# Patient Record
Sex: MALE | Race: WHITE | NOT HISPANIC OR LATINO | Employment: FULL TIME | URBAN - METROPOLITAN AREA
[De-identification: names, ages, dates, MRNs, and addresses within clinical notes are randomized per-mention and may not be internally consistent; named-entity substitution may affect disease eponyms.]

---

## 2017-05-03 ENCOUNTER — ALLSCRIPTS OFFICE VISIT (OUTPATIENT)
Dept: OTHER | Facility: OTHER | Age: 32
End: 2017-05-03

## 2017-11-16 ENCOUNTER — GENERIC CONVERSION - ENCOUNTER (OUTPATIENT)
Dept: OTHER | Facility: OTHER | Age: 32
End: 2017-11-16

## 2018-01-10 NOTE — PROGRESS NOTES
Assessment    1  Encounter for preventive health examination (V70 0) (Z00 00)   2  Ankle pain (719 47) (M25 579)    Plan  Ankle pain    · * XR ANKLE 3+ VIEW RIGHT; Status:Active; Requested for:16Apr2016;    · * XR ANKLE 3+ VIEW RIGHT; Status:Active; Requested for:16Apr2016;    · *1 - [facility] Referral  Consult  Status: Active  Requested  for: 16Apr2016  Care Summary provided  : Yes  Health Maintenance    · (1) HEMOGLOBIN A1C; Status:Active; Requested for:16Apr2016;    · Urine Dip Non-Automated- POC; Status:Resulted - Requires Verification;   Done:  [id number] 09:26AM    Discussion/Summary    Health maintenance discussion  xray ankle  refer to [facility] to assess and consider treatment/PT options and air cast if needed, continue soft brace  check labs  repeat annually  Chief Complaint  PHYSICAL  ALSO HAVING RT ANKLE PAIN 1 MONTH      History of Present Illness  HPI: 36year old man for routine exam  No sob, palpitations, chest symptoms, rash, GI or  complaint  About a month ago injured right ankle at the gym  Using soft brace  some mild pain  ROS otw negative  Works at [employer] as   No other complaints  Active Problems    1  Ankle pain (719 47) (M25 579)    Family History    · Family history of hypertension (V17 49) (Z82 49)    · Family history of Type 2 diabetes mellitus with complication    Social History    · Never a smoker    Current Meds   1  No Reported Medications Recorded    Allergies    1  No Known Drug Allergies    Vitals   Recorded: 16Apr2016 09:24AM   Temperature 97 5 F   Heart Rate 74   Systolic 849   Diastolic 72   Height 5 ft 6 5 in   Weight 221 lb    BMI Calculated 35 14   BSA Calculated 2 1   O2 Saturation 98     Physical Exam    Constitutional   General appearance: No acute distress, well appearing and well nourished  Head and Face   Head and face: Normal     Eyes   Conjunctiva and lids: No erythema, swelling or discharge      Ophthalmoscopic examination: Normal fundi and optic discs  Ears, Nose, Mouth, and Throat   External inspection of ears and nose: Normal     Oropharynx: Normal with no erythema, edema, exudate or lesions  Pulmonary   Respiratory effort: No increased work of breathing or signs of respiratory distress  Cardiovascular   Auscultation of heart: Normal rate and rhythm, normal S1 and S2, no murmurs  Carotid pulses: 2+ bilaterally  Pedal pulses: 2+ bilaterally  Chest   Chest: Normal     Abdomen   Abdomen: Non-tender, no masses  Genitourinary   Scrotal contents: Normal testes, no masses  Penis: Normal, no lesions  Lymphatic   Palpation of lymph nodes in neck: No lymphadenopathy  Musculoskeletal   Gait and station: Normal     Inspection/palpation of joints, bones, and muscles: Abnormal   slight pain on inversion right ankle, no swelling, no laxity  Neurologic   Cranial nerves: Cranial nerves 2-12 intact  Results/Data  PHQ-2 Adult Depression Screening 16Apr2016 09:27AM Russ Diop     Test Name Result Flag Reference   PHQ-2 Adult Depression Score 0     Q1: 0, Q2: 0   PHQ-2 Adult Depression Screening Negative       Urine Dip Non-Automated- POC [id number] 09:26AM Sarah Resendiz     Test Name Result Flag Reference   Color Clear     Clarity 0 5M     Leukocytes N     Nitrite N     Blood N     Urobilinogen N     Protein N     Ph 5     Specific Gravity 1 000     Ketone N     Glucose N         Procedure    Procedure: Audiometry:   Hearing in the right ear: 20 decibals at 500 hertz, 20 decibals at 1000 hertz, 20 decibals at 2000 hertz, 20 decibals at 4000 hertz, 20 decibals at 6000 hertz and 20 decibals at 8000 hertz  Hearing in the left ear: 20 decibals at 500 hertz, 20 decibals at 1000 hertz, 20 decibals at 2000 hertz, 20 decibals at 4000 hertz, 20 decibals at 6000 hertz and 20 decibals at 8000 hertz        Procedure:   Results: 20/20 in both eyes without corrective device, 20/20 in the right eye without corrective device, 20/20 in the left eye without corrective device RED/GREEN      Signatures   Electronically signed by : Mesha Wisdom DO;  Apr 16 2016  9:40AM EST                       (Author)

## 2018-01-14 VITALS
DIASTOLIC BLOOD PRESSURE: 81 MMHG | SYSTOLIC BLOOD PRESSURE: 120 MMHG | BODY MASS INDEX: 34.22 KG/M2 | WEIGHT: 215.25 LBS | HEART RATE: 77 BPM | RESPIRATION RATE: 18 BRPM

## 2018-01-15 NOTE — RESULT NOTES
Verified Results  * XR ANKLE 3+ VIEW RIGHT 16Apr2016 10:06AM Tamia [place] Order Number: [id number]     Test Name Result Flag Reference   XR ANKLE 3+ VW RIGHT (Report)     RIGHT ANKLE     INDICATION: Right ankle pain for the past month after weight lifting     COMPARISON: None     VIEWS: 3; 3 images     FINDINGS:     There is no acute fracture or dislocation  There is posterior calcaneal bone spur  No significant degenerative arthritis seen  No lytic or blastic lesions are seen  Soft tissues are unremarkable         IMPRESSION:     Posterior calcaneal bone spur       Workstation performed: [id number]     Signed by:   Umair Carr MD   4/17/16

## 2018-01-18 NOTE — RESULT NOTES
Verified Results  (1) CBC/PLT/DIFF [id number] 10:00AM [employer]     Test Name Result Flag Reference   WBC 8 8 x10E3/uL  3 4-10 8   RBC 5 46 x10E6/uL  4 14-5 80   Hemoglobin 16 2 g/dL  12 6-17 7   Hematocrit 47 5 %  37 5-51 0   MCV 87 fL  79-97   MCH 29 7 pg  26 6-33 0   MCHC 34 1 g/dL  31 5-35 7   RDW 13 1 %  12 3-15 4   Platelets 781 J94H8/MV  150-379   Neutrophils 54 %     Lymphs 37 %     Monocytes 6 %     Eos 2 %     Basos 1 %     Neutrophils (Absolute) 4 8 x10E3/uL  1 4-7 0   Lymphs (Absolute) 3 2 x10E3/uL H 0 7-3 1   Monocytes(Absolute) 0 6 x10E3/uL  0 1-0 9   Eos (Absolute) 0 1 x10E3/uL  0 0-0 4   Baso (Absolute) 0 0 x10E3/uL  0 0-0 2   Immature Granulocytes 0 %     Immature Grans (Abs) 0 0 x10E3/uL  0 0-0 1     (1) BASIC METABOLIC PROFILE [id number] 10:00AM [employer]     Test Name Result Flag Reference   Glucose, Serum 94 mg/dL  65-99   BUN 12 mg/dL  6-20   Creatinine, Serum 0 93 mg/dL  0 76-1 27   eGFR If NonAfricn Am 111 mL/min/1 73  >59   eGFR If Africn Am 128 mL/min/1 73  >59   BUN/Creatinine Ratio 13  8-19   Sodium, Serum 142 mmol/L  134-144   Potassium, Serum 4 7 mmol/L  3 5-5 2   Chloride, Serum 102 mmol/L     Carbon Dioxide, Total 24 mmol/L  18-29   Calcium, Serum 9 4 mg/dL  8 7-10 2     (1) HEPATIC FUNCTION PANEL [id number] 10:00AM [employer]     Test Name Result Flag Reference   Protein, Total, Serum 7 1 g/dL  6 0-8 5   Albumin, Serum 4 4 g/dL  3 5-5 5   Bilirubin, Total <0 2 mg/dL  0 0-1 2   Bilirubin, Direct 0 07 mg/dL  0 00-0 40   Alkaline Phosphatase, S 78 IU/L     AST (SGOT) 18 IU/L  0-40   ALT (SGPT) 22 IU/L  0-44     (LC) Lipid Panel [id number] 10:00AM Radha Haskins     Test Name Result Flag Reference   Cholesterol, Total 173 mg/dL  100-199   Triglycerides 325 mg/dL H 0-149   Serum is slightly lipemic  HDL Cholesterol 40 mg/dL  >39   According to ATP-III Guidelines, HDL-C >59 mg/dL is considered a  negative risk factor for CHD     VLDL Cholesterol Giovanni 65 mg/dL H 5-40 LDL Cholesterol Calc 68 mg/dL  0-99     (1) HEMOGLOBIN A1C [id number] 10:00AM Priyanka Call     Test Name Result Flag Reference   Hemoglobin A1c 5 2 %  4 8-5 6   Pre-diabetes: 5 7 - 6 4           Diabetes: >6 4           Glycemic control for adults with diabetes: <7 0

## 2018-01-22 VITALS
BODY MASS INDEX: 35 KG/M2 | RESPIRATION RATE: 20 BRPM | HEART RATE: 89 BPM | DIASTOLIC BLOOD PRESSURE: 88 MMHG | WEIGHT: 220.13 LBS | SYSTOLIC BLOOD PRESSURE: 139 MMHG

## 2018-05-09 VITALS
SYSTOLIC BLOOD PRESSURE: 137 MMHG | BODY MASS INDEX: 36.25 KG/M2 | DIASTOLIC BLOOD PRESSURE: 91 MMHG | HEART RATE: 77 BPM | WEIGHT: 228 LBS | RESPIRATION RATE: 20 BRPM

## 2018-05-09 DIAGNOSIS — M25.571 PAIN, JOINT, ANKLE AND FOOT, RIGHT: Primary | ICD-10-CM

## 2018-05-09 PROCEDURE — 99213 OFFICE O/P EST LOW 20 MIN: CPT | Performed by: ORTHOPAEDIC SURGERY

## 2018-05-09 NOTE — LETTER
May 9, 2018     Patient: Yonathan Rice   YOB: 1985   Date of Visit: 5/9/2018       To Whom it May Concern:    Yonathan Rice is under my professional care  He was seen in my office on 5/9/2018  He may return to work on May 9,2018  Please excuse Mr Salvatore Vergara from being late  He was in our office today  If you have any questions or concerns, please don't hesitate to call           Sincerely,          Lupe Wilde MD        CC: No Recipients

## 2018-05-09 NOTE — PROGRESS NOTES
34 y o male with right ankle weakness and occasional flare-ups which limits him from work  At this time patient is asymptomatic  No numbness tingling fevers chills  Review of Systems  Review of systems negative unless otherwise specified in HPI    Past Medical History  History reviewed  No pertinent past medical history  Past Surgical History  History reviewed  No pertinent surgical history  Current Medications  No current outpatient prescriptions on file prior to visit  No current facility-administered medications on file prior to visit  Recent Labs (HCT,HGB,PT,INR,ESR,CRP,GLU,HgA1C)    0  Lab Value Date/Time   HCT 47 5 04/16/2016 1000   HGB 16 2 04/16/2016 1000   WBC 8 8 04/16/2016 1000   GLUCOSE 94 04/16/2016 1000   HGBA1C 5 2 04/16/2016 1000         Physical exam  [unfilled]  Right ankle:  No abrasions, no open wounds, ankle strength dorsiflexes and plantar flexes about 4+ out of 5 compared to contralateral side  No pain with stress of subtalar joint with eversion or inversion  Neurologically and vascularly intact distally    Imaging      Procedure      Assessment/Plan:   33 y o male right ankle strain which is currently doing well  Plan is as follows:    Weightbearing as tolerated    Follow-up p r n  Discussed treatment plan with patient and he is in agreement treatment plan    Thank you

## 2019-04-11 ENCOUNTER — TELEPHONE (OUTPATIENT)
Dept: OBGYN CLINIC | Facility: HOSPITAL | Age: 34
End: 2019-04-11

## 2019-09-27 ENCOUNTER — OFFICE VISIT (OUTPATIENT)
Dept: FAMILY MEDICINE CLINIC | Facility: CLINIC | Age: 34
End: 2019-09-27
Payer: COMMERCIAL

## 2019-09-27 VITALS
WEIGHT: 243.2 LBS | RESPIRATION RATE: 16 BRPM | OXYGEN SATURATION: 99 % | HEIGHT: 67 IN | BODY MASS INDEX: 38.17 KG/M2 | TEMPERATURE: 97 F | SYSTOLIC BLOOD PRESSURE: 140 MMHG | HEART RATE: 76 BPM | DIASTOLIC BLOOD PRESSURE: 98 MMHG

## 2019-09-27 DIAGNOSIS — Z00.00 ANNUAL PHYSICAL EXAM: ICD-10-CM

## 2019-09-27 DIAGNOSIS — R03.0 ELEVATED BLOOD PRESSURE READING WITHOUT DIAGNOSIS OF HYPERTENSION: ICD-10-CM

## 2019-09-27 DIAGNOSIS — Z00.00 WELL ADULT HEALTH CHECK: Primary | ICD-10-CM

## 2019-09-27 DIAGNOSIS — Z23 ENCOUNTER FOR IMMUNIZATION: ICD-10-CM

## 2019-09-27 DIAGNOSIS — E66.09 CLASS 2 OBESITY DUE TO EXCESS CALORIES WITHOUT SERIOUS COMORBIDITY WITH BODY MASS INDEX (BMI) OF 37.0 TO 37.9 IN ADULT: ICD-10-CM

## 2019-09-27 PROBLEM — E66.812 CLASS 2 OBESITY DUE TO EXCESS CALORIES WITHOUT SERIOUS COMORBIDITY WITH BODY MASS INDEX (BMI) OF 37.0 TO 37.9 IN ADULT: Status: ACTIVE | Noted: 2019-09-27

## 2019-09-27 LAB
SL AMB  POCT GLUCOSE, UA: NORMAL
SL AMB LEUKOCYTE ESTERASE,UA: NORMAL
SL AMB POCT BILIRUBIN,UA: NORMAL
SL AMB POCT BLOOD,UA: NORMAL
SL AMB POCT CLARITY,UA: CLEAR
SL AMB POCT COLOR,UA: YELLOW
SL AMB POCT KETONES,UA: NORMAL
SL AMB POCT NITRITE,UA: NORMAL
SL AMB POCT PH,UA: 5
SL AMB POCT SPECIFIC GRAVITY,UA: 1.01
SL AMB POCT URINE PROTEIN: NORMAL
SL AMB POCT UROBILINOGEN: NORMAL

## 2019-09-27 PROCEDURE — 92551 PURE TONE HEARING TEST AIR: CPT | Performed by: PHYSICIAN ASSISTANT

## 2019-09-27 PROCEDURE — 81002 URINALYSIS NONAUTO W/O SCOPE: CPT | Performed by: PHYSICIAN ASSISTANT

## 2019-09-27 PROCEDURE — 90471 IMMUNIZATION ADMIN: CPT | Performed by: PHYSICIAN ASSISTANT

## 2019-09-27 PROCEDURE — 90686 IIV4 VACC NO PRSV 0.5 ML IM: CPT | Performed by: PHYSICIAN ASSISTANT

## 2019-09-27 PROCEDURE — 99173 VISUAL ACUITY SCREEN: CPT | Performed by: PHYSICIAN ASSISTANT

## 2019-09-27 PROCEDURE — 99395 PREV VISIT EST AGE 18-39: CPT | Performed by: PHYSICIAN ASSISTANT

## 2019-09-27 NOTE — PATIENT INSTRUCTIONS

## 2019-09-27 NOTE — PROGRESS NOTES
[address]    NAME: Eddie Kelly  AGE: 30 y o  SEX: male  : 1985     DATE: 2019     Assessment and Plan:     Problem List Items Addressed This Visit        Other    Class 2 obesity due to excess calories without serious comorbidity with body mass index (BMI) of 37 0 to 37 9 in adult    Relevant Orders    TSH, 3rd generation    Elevated blood pressure reading without diagnosis of hypertension    Relevant Orders    TSH, 3rd generation    Comprehensive metabolic panel    Lipid panel      Other Visit Diagnoses     Well adult health check    -  Primary    Relevant Orders    POCT urine dip (Completed)    Encounter for immunization        Relevant Orders    influenza vaccine, [id number], quadrivalent, 0 5 mL, preservative-free, for adult and pediatric patients 6 mos+ (AFLURIA, FLUARIX, FLULAVAL, FLUZONE) (Completed)          Immunizations and preventive care screenings were discussed with patient today  Appropriate education was printed on patient's after visit summary  Counseling:  · Exercise: the importance of regular exercise/physical activity was discussed  Recommend exercise 3-5 times per week for at least 30 minutes  BMI Counseling: Body mass index is 37 81 kg/m²  The BMI is above normal  Nutrition recommendations include decreasing portion sizes, limiting drinks that contain sugar and moderation in carbohydrate intake  Exercise recommendations include exercising 3-5 times per week  No pharmacotherapy was ordered  Return in about 4 weeks (around 10/25/2019)  Chief Complaint:     Chief Complaint   Patient presents with    Physical Exam      History of Present Illness:     Adult Annual Physical   Patient here for a comprehensive physical exam  The patient reports no problems  Diet and Physical Activity  · Diet/Nutrition: well balanced diet  · Exercise: walking        Depression Screening  PHQ-9 Depression Screening    PHQ-9:    Frequency of the following problems over the past two weeks:       Little interest or pleasure in doing things:  0 - not at all  Feeling down, depressed, or hopeless:  0 - not at all  PHQ-2 Score:  0       General Health  · Sleep: sleeps well  · Hearing: normal - bilateral   · Vision: no vision problems  · Dental: regular dental visits  [facility]  · History of STDs?: no      Review of Systems:     Review of Systems   Constitutional: Negative for activity change, appetite change, chills, fatigue and fever  HENT: Negative for ear pain and sore throat  Eyes: Negative for visual disturbance  Respiratory: Negative for cough and shortness of breath  Cardiovascular: Negative for chest pain, palpitations and leg swelling  Gastrointestinal: Negative for abdominal pain, blood in stool, constipation, diarrhea and nausea  Genitourinary: Negative for difficulty urinating  Musculoskeletal: Positive for back pain  Negative for arthralgias and myalgias  Skin: Negative for rash  Neurological: Negative for dizziness, syncope and headaches  Psychiatric/Behavioral: Negative for sleep disturbance and suicidal ideas  The patient is not nervous/anxious  Past Medical History:     No past medical history on file  Past Surgical History:     No past surgical history on file     Social History:     Social History     Socioeconomic History    Marital status: Single     Spouse name: None    Number of children: None    Years of education: None    Highest education level: None   Occupational History    None   Social Needs    Financial resource strain: None    Food insecurity:     Worry: None     Inability: None    Transportation needs:     Medical: None     Non-medical: None   Tobacco Use    Smoking status: Never Smoker    Smokeless tobacco: Never Used   Substance and Sexual Activity    Alcohol use: None    Drug use: None    Sexual activity: None   Lifestyle    Physical activity:     Days per week: None     Minutes per session: None    Stress: None   Relationships    Social connections:     Talks on phone: None     Gets together: None     Attends Scientologist service: None     Active member of club or organization: None     Attends meetings of clubs or organizations: None     Relationship status: None    Intimate partner violence:     Fear of current or ex partner: None     Emotionally abused: None     Physically abused: None     Forced sexual activity: None   Other Topics Concern    None   Social History Narrative    None      Family History:     Family History   Problem Relation Age of Onset    Hypertension Mother     Diabetes Father     Diabetes type II Father       Current Medications:     No current outpatient medications on file  No current facility-administered medications for this visit  Allergies:     No Known Allergies   Physical Exam:     /98   Pulse 76   Temp (!) 97 °F (36 1 °C)   Resp 16   Ht 5' 7 25" (1 708 m)   Wt 110 kg (243 lb 3 2 oz)   SpO2 99%   BMI 37 81 kg/m²     Physical Exam   Constitutional: He is oriented to person, place, and time  He appears well-developed and well-nourished  Overweight male   HENT:   Head: Normocephalic and atraumatic  Right Ear: Tympanic membrane, external ear and ear canal normal    Left Ear: Tympanic membrane, external ear and ear canal normal    Mouth/Throat: Oropharynx is clear and moist    Eyes: Pupils are equal, round, and reactive to light  Conjunctivae are normal    Neck: Neck supple  Carotid bruit is not present  No thyromegaly present  Cardiovascular: Normal rate, regular rhythm and normal heart sounds  No murmur heard  Pulmonary/Chest: Effort normal and breath sounds normal  He has no wheezes  Abdominal: Soft  Bowel sounds are normal  He exhibits no mass  There is no tenderness  Musculoskeletal: He exhibits no edema  Lymphadenopathy:     He has no cervical adenopathy     Neurological: He is alert and oriented to person, place, and time  Skin: Skin is warm and dry  No rash noted  No erythema  Psychiatric: He has a normal mood and affect  His behavior is normal  Judgment and thought content normal    Nursing note and vitals reviewed        RUDDY Soria

## 2019-09-27 NOTE — PROGRESS NOTES
BMI Counseling: Body mass index is 37 81 kg/m²  The BMI is above normal  Nutrition recommendations include reducing portion sizes, decreasing soda and/or juice intake and moderation in carbohydrate intake  Exercise recommendations include exercising 3-5 times per week

## 2019-10-12 LAB
ALBUMIN SERPL-MCNC: 4.4 G/DL (ref 3.6–5.1)
ALBUMIN/GLOB SERPL: 1.6 (CALC) (ref 1–2.5)
ALP SERPL-CCNC: 66 U/L (ref 40–115)
ALT SERPL-CCNC: 24 U/L (ref 9–46)
AST SERPL-CCNC: 21 U/L (ref 10–40)
BILIRUB SERPL-MCNC: 0.6 MG/DL (ref 0.2–1.2)
BUN SERPL-MCNC: 20 MG/DL (ref 7–25)
BUN/CREAT SERPL: NORMAL (CALC) (ref 6–22)
CALCIUM SERPL-MCNC: 9.2 MG/DL (ref 8.6–10.3)
CHLORIDE SERPL-SCNC: 105 MMOL/L (ref 98–110)
CHOLEST SERPL-MCNC: 217 MG/DL
CHOLEST/HDLC SERPL: 5 (CALC)
CO2 SERPL-SCNC: 26 MMOL/L (ref 20–32)
CREAT SERPL-MCNC: 0.98 MG/DL (ref 0.6–1.35)
GLOBULIN SER CALC-MCNC: 2.8 G/DL (CALC) (ref 1.9–3.7)
GLUCOSE SERPL-MCNC: 85 MG/DL (ref 65–99)
HDLC SERPL-MCNC: 43 MG/DL
LDLC SERPL CALC-MCNC: 133 MG/DL (CALC)
NONHDLC SERPL-MCNC: 174 MG/DL (CALC)
POTASSIUM SERPL-SCNC: 4.6 MMOL/L (ref 3.5–5.3)
PROT SERPL-MCNC: 7.2 G/DL (ref 6.1–8.1)
SL AMB EGFR AFRICAN AMERICAN: 118 ML/MIN/1.73M2
SL AMB EGFR NON AFRICAN AMERICAN: 102 ML/MIN/1.73M2
SODIUM SERPL-SCNC: 140 MMOL/L (ref 135–146)
TRIGL SERPL-MCNC: 267 MG/DL
TSH SERPL-ACNC: 1.74 MIU/L (ref 0.4–4.5)

## 2019-10-30 PROBLEM — E78.2 MIXED HYPERLIPIDEMIA: Status: ACTIVE | Noted: 2019-10-30

## 2019-11-01 ENCOUNTER — OFFICE VISIT (OUTPATIENT)
Dept: FAMILY MEDICINE CLINIC | Facility: CLINIC | Age: 34
End: 2019-11-01
Payer: COMMERCIAL

## 2019-11-01 VITALS
WEIGHT: 242 LBS | SYSTOLIC BLOOD PRESSURE: 138 MMHG | HEART RATE: 83 BPM | DIASTOLIC BLOOD PRESSURE: 90 MMHG | HEIGHT: 67 IN | RESPIRATION RATE: 16 BRPM | BODY MASS INDEX: 37.98 KG/M2 | OXYGEN SATURATION: 98 % | TEMPERATURE: 97.3 F

## 2019-11-01 DIAGNOSIS — E66.09 CLASS 2 OBESITY DUE TO EXCESS CALORIES WITHOUT SERIOUS COMORBIDITY WITH BODY MASS INDEX (BMI) OF 37.0 TO 37.9 IN ADULT: Primary | ICD-10-CM

## 2019-11-01 DIAGNOSIS — E78.2 MIXED HYPERLIPIDEMIA: ICD-10-CM

## 2019-11-01 DIAGNOSIS — R03.0 ELEVATED BLOOD PRESSURE READING WITHOUT DIAGNOSIS OF HYPERTENSION: ICD-10-CM

## 2019-11-01 PROCEDURE — 99214 OFFICE O/P EST MOD 30 MIN: CPT | Performed by: PHYSICIAN ASSISTANT

## 2019-11-01 NOTE — PROGRESS NOTES
Assessment/Plan:     Diagnoses and all orders for this visit:    Class 2 obesity due to excess calories without serious comorbidity with body mass index (BMI) of 37 0 to 37 9 in adult  Comments:  Patient will continue his exercise regimen  Mixed hyperlipidemia  Comments:  Low carb low sugar diet walks the fats we will repeat the lipid panel in 3 months  Orders:  -     Lipid panel; Future  -     Comprehensive metabolic panel; Future    Elevated blood pressure reading without diagnosis of hypertension  Comments:  Patient will watch his salt  Patient will be again home blood pressure monitoring  Call if blood pressure over   140/90  Orders:  -     Comprehensive metabolic panel; Future          Subjective:      Patient ID: Georgie Santana is a 30 y o  male  Patient presents for short interval follow-up for elevated blood pressure  Blood pressure this morning was elevated  Patient states he took his energy drink this morning for he forgot he was coming  Energy drink has about 500 mg of caffeine  Labs reviewed with patient show normal CMP and normal TSH  Id panel reveals a total cholesterol 217 HDL 43  and triglycerides are elevated at 267  Discussed low carb low sugar diet  Patient is exercising regularly  Patient will also need to follow low fat diet  We will reassess lipid panel in 3-4 months patient may need to start medication  The following portions of the patient's history were reviewed and updated as appropriate:   He   Patient Active Problem List    Diagnosis Date Noted    Mixed hyperlipidemia 10/30/2019    Class 2 obesity due to excess calories without serious comorbidity with body mass index (BMI) of 37 0 to 37 9 in adult 09/27/2019    Elevated blood pressure reading without diagnosis of hypertension 09/27/2019     No current outpatient medications on file  No current facility-administered medications for this visit        No current outpatient medications on file prior to visit  No current facility-administered medications on file prior to visit  He has No Known Allergies       Review of Systems   Respiratory: Negative for cough and shortness of breath  Cardiovascular: Negative for chest pain and leg swelling  Neurological: Negative for dizziness and headaches  Objective:        Physical Exam   Constitutional: He is oriented to person, place, and time  He appears well-developed and well-nourished  HENT:   Head: Normocephalic and atraumatic  Right Ear: External ear normal    Left Ear: External ear normal    Cardiovascular: Normal rate and regular rhythm  Pulmonary/Chest: Effort normal and breath sounds normal    Musculoskeletal: He exhibits no edema  Neurological: He is alert and oriented to person, place, and time  Skin: Skin is warm and dry  Nursing note and vitals reviewed

## 2020-02-07 ENCOUNTER — OFFICE VISIT (OUTPATIENT)
Dept: FAMILY MEDICINE CLINIC | Facility: CLINIC | Age: 35
End: 2020-02-07
Payer: COMMERCIAL

## 2020-02-07 VITALS
SYSTOLIC BLOOD PRESSURE: 132 MMHG | RESPIRATION RATE: 16 BRPM | HEIGHT: 66 IN | BODY MASS INDEX: 38.73 KG/M2 | TEMPERATURE: 98.6 F | HEART RATE: 67 BPM | DIASTOLIC BLOOD PRESSURE: 90 MMHG | WEIGHT: 241 LBS | OXYGEN SATURATION: 98 %

## 2020-02-07 DIAGNOSIS — E78.2 MIXED HYPERLIPIDEMIA: Primary | ICD-10-CM

## 2020-02-07 DIAGNOSIS — R03.0 ELEVATED BLOOD PRESSURE READING WITHOUT DIAGNOSIS OF HYPERTENSION: ICD-10-CM

## 2020-02-07 DIAGNOSIS — Z11.4 SCREENING FOR HIV (HUMAN IMMUNODEFICIENCY VIRUS): ICD-10-CM

## 2020-02-07 DIAGNOSIS — E66.09 CLASS 2 OBESITY DUE TO EXCESS CALORIES WITHOUT SERIOUS COMORBIDITY WITH BODY MASS INDEX (BMI) OF 37.0 TO 37.9 IN ADULT: ICD-10-CM

## 2020-02-07 DIAGNOSIS — Z11.59 ENCOUNTER FOR HEPATITIS C SCREENING TEST FOR LOW RISK PATIENT: ICD-10-CM

## 2020-02-07 PROCEDURE — 1036F TOBACCO NON-USER: CPT | Performed by: PHYSICIAN ASSISTANT

## 2020-02-07 PROCEDURE — 3008F BODY MASS INDEX DOCD: CPT | Performed by: PHYSICIAN ASSISTANT

## 2020-02-07 PROCEDURE — 99213 OFFICE O/P EST LOW 20 MIN: CPT | Performed by: PHYSICIAN ASSISTANT

## 2020-02-07 RX ORDER — MULTIVITAMIN
1 TABLET ORAL DAILY
COMMUNITY

## 2020-02-07 NOTE — PROGRESS NOTES
BMI Counseling: Body mass index is 38 61 kg/m²  The BMI is above normal  Nutrition recommendations include decreasing portion sizes and moderation in carbohydrate intake  Exercise recommendations include exercising 3-5 times per week and obtaining a gym membership  No pharmacotherapy was ordered  Assessment/Plan:     Diagnoses and all orders for this visit:    Mixed hyperlipidemia  Comments:  Patient would like to continue with diet  We will check lipid panel in 3 months    Elevated blood pressure reading without diagnosis of hypertension  Comments:  Home blood pressure readings are acceptable  Reading in office is elevated today  Continue diet and exercise for weight loss  Watch the salt    Class 2 obesity due to excess calories without serious comorbidity with body mass index (BMI) of 37 0 to 37 9 in adult  Comments: We have set a goal for patient to lose 40 lb in the year 2020    Encounter for hepatitis C screening test for low risk patient  -     Hepatitis C antibody; Future    Screening for HIV (human immunodeficiency virus)  -     HIV 1/2 AG-AB combo; Future    Other orders  -     Multiple Vitamin (MULTIVITAMIN) tablet; Take 1 tablet by mouth daily          Subjective:      Patient ID: Florinda Huizar is a 37 y o  male  Patient presents for short interval follow-up for elevated blood pressure  Patient also has hyperlipidemia which we are trying to gain control of with diet  His last labs were in October  Patient has been going to the gym and working out  He has been monitoring his blood pressure at home it has been normal   Patient is lost approximately 2 lb according to my scale  According patient scale he is lost 5 lb  Blood pressure is better in the office today  We need to recheck patient's lipid profile        The following portions of the patient's history were reviewed and updated as appropriate:   He   Patient Active Problem List    Diagnosis Date Noted    Mixed hyperlipidemia 10/30/2019    Class 2 obesity due to excess calories without serious comorbidity with body mass index (BMI) of 37 0 to 37 9 in adult 09/27/2019    Elevated blood pressure reading without diagnosis of hypertension 09/27/2019     Current Outpatient Medications   Medication Sig Dispense Refill    Multiple Vitamin (MULTIVITAMIN) tablet Take 1 tablet by mouth daily       No current facility-administered medications for this visit  Current Outpatient Medications on File Prior to Visit   Medication Sig    Multiple Vitamin (MULTIVITAMIN) tablet Take 1 tablet by mouth daily     No current facility-administered medications on file prior to visit       Review of Systems   Constitutional: Negative for activity change, appetite change, chills, fatigue and fever  HENT: Negative for ear pain and sore throat  Eyes: Negative for visual disturbance  Respiratory: Negative for cough and shortness of breath  Cardiovascular: Negative for chest pain, palpitations and leg swelling  Gastrointestinal: Negative for abdominal pain, blood in stool, constipation, diarrhea and nausea  Genitourinary: Negative for difficulty urinating  Musculoskeletal: Negative for arthralgias, back pain and myalgias  Skin: Negative for rash  Neurological: Negative for dizziness, syncope and headaches  Psychiatric/Behavioral: Negative for sleep disturbance  Objective:        Physical Exam   Constitutional: He is oriented to person, place, and time  He appears well-developed and well-nourished  No distress  Over  Weight   male   HENT:   Head: Normocephalic and atraumatic  Right Ear: External ear normal    Left Ear: External ear normal    Mouth/Throat: Oropharynx is clear and moist  No oropharyngeal exudate  Eyes: Pupils are equal, round, and reactive to light  Conjunctivae are normal    Neck: Normal range of motion  No thyromegaly present  Cardiovascular: Normal rate, regular rhythm and normal heart sounds  Pulmonary/Chest: Effort normal    Musculoskeletal: He exhibits no edema  Neurological: He is alert and oriented to person, place, and time  Coordination normal    Skin: Skin is warm and dry  He is not diaphoretic  Nursing note and vitals reviewed

## 2020-03-18 ENCOUNTER — TELEPHONE (OUTPATIENT)
Dept: FAMILY MEDICINE CLINIC | Facility: CLINIC | Age: 35
End: 2020-03-18

## 2020-05-15 ENCOUNTER — TELEMEDICINE (OUTPATIENT)
Dept: FAMILY MEDICINE CLINIC | Facility: CLINIC | Age: 35
End: 2020-05-15
Payer: COMMERCIAL

## 2020-05-15 VITALS
BODY MASS INDEX: 37.17 KG/M2 | HEIGHT: 66 IN | SYSTOLIC BLOOD PRESSURE: 128 MMHG | TEMPERATURE: 99.1 F | DIASTOLIC BLOOD PRESSURE: 90 MMHG | WEIGHT: 231.3 LBS | HEART RATE: 70 BPM

## 2020-05-15 DIAGNOSIS — I10 ESSENTIAL HYPERTENSION: ICD-10-CM

## 2020-05-15 DIAGNOSIS — E78.2 MIXED HYPERLIPIDEMIA: ICD-10-CM

## 2020-05-15 DIAGNOSIS — E66.09 CLASS 2 OBESITY DUE TO EXCESS CALORIES WITHOUT SERIOUS COMORBIDITY WITH BODY MASS INDEX (BMI) OF 37.0 TO 37.9 IN ADULT: Primary | ICD-10-CM

## 2020-05-15 PROCEDURE — 99213 OFFICE O/P EST LOW 20 MIN: CPT | Performed by: PHYSICIAN ASSISTANT

## 2020-05-15 RX ORDER — LISINOPRIL 5 MG/1
5 TABLET ORAL DAILY
Qty: 90 TABLET | Refills: 1 | Status: SHIPPED | OUTPATIENT
Start: 2020-05-15 | End: 2021-01-03

## 2020-09-24 ENCOUNTER — IMMUNIZATIONS (OUTPATIENT)
Dept: FAMILY MEDICINE CLINIC | Facility: CLINIC | Age: 35
End: 2020-09-24
Payer: COMMERCIAL

## 2020-09-24 DIAGNOSIS — Z23 ENCOUNTER FOR IMMUNIZATION: Primary | ICD-10-CM

## 2020-09-24 PROCEDURE — 90686 IIV4 VACC NO PRSV 0.5 ML IM: CPT

## 2020-09-24 PROCEDURE — 90471 IMMUNIZATION ADMIN: CPT

## 2021-01-02 DIAGNOSIS — I10 ESSENTIAL HYPERTENSION: ICD-10-CM

## 2021-01-03 RX ORDER — LISINOPRIL 5 MG/1
TABLET ORAL
Qty: 90 TABLET | Refills: 0 | Status: SHIPPED | OUTPATIENT
Start: 2021-01-03 | End: 2021-03-18 | Stop reason: SDDI

## 2021-03-18 ENCOUNTER — TELEMEDICINE (OUTPATIENT)
Dept: FAMILY MEDICINE CLINIC | Facility: CLINIC | Age: 36
End: 2021-03-18
Payer: COMMERCIAL

## 2021-03-18 VITALS
WEIGHT: 220.4 LBS | TEMPERATURE: 97.5 F | SYSTOLIC BLOOD PRESSURE: 121 MMHG | BODY MASS INDEX: 35.42 KG/M2 | HEIGHT: 66 IN | HEART RATE: 69 BPM | DIASTOLIC BLOOD PRESSURE: 84 MMHG

## 2021-03-18 DIAGNOSIS — E78.2 MIXED HYPERLIPIDEMIA: ICD-10-CM

## 2021-03-18 DIAGNOSIS — E66.09 CLASS 2 OBESITY DUE TO EXCESS CALORIES WITHOUT SERIOUS COMORBIDITY WITH BODY MASS INDEX (BMI) OF 37.0 TO 37.9 IN ADULT: ICD-10-CM

## 2021-03-18 DIAGNOSIS — I10 ESSENTIAL HYPERTENSION: Primary | ICD-10-CM

## 2021-03-18 PROCEDURE — 99213 OFFICE O/P EST LOW 20 MIN: CPT | Performed by: PHYSICIAN ASSISTANT

## 2021-03-18 NOTE — PROGRESS NOTES
Virtual Regular Visit      Assessment/Plan:    Problem List Items Addressed This Visit        Cardiovascular and Mediastinum    Essential hypertension - Primary    Relevant Orders    Comprehensive metabolic panel    Lipid panel       Other    Class 2 obesity due to excess calories without serious comorbidity with body mass index (BMI) of 37 0 to 37 9 in adult    Mixed hyperlipidemia    Relevant Orders    Comprehensive metabolic panel    Lipid panel          BMI Counseling: Body mass index is 37 05 kg/m²  The BMI is above normal  Nutrition recommendations include decreasing portion sizes, consuming healthier snacks and moderation in carbohydrate intake  Exercise recommendations include exercising 3-5 times per week  Reason for visit is   Chief Complaint   Patient presents with    Virtual Regular Visit        Encounter provider Ward Marin PA-C    Provider located at [address] [place] [id number] [phone]      Recent Visits  No visits were found meeting these conditions  Showing recent visits within past 7 days and meeting all other requirements     Today's Visits  Date Type Provider Dept   03/18/21 Telemedicine RUDDY Parikh Pg   Showing today's visits and meeting all other requirements     Future Appointments  No visits were found meeting these conditions  Showing future appointments within next 150 days and meeting all other requirements        The patient was identified by name and date of birth  Antoine Power was informed that this is a telemedicine visit and that the visit is being conducted through [employer] and patient was informed that this is not a secure, HIPAA-compliant platform  He agrees to proceed     My office door was closed  No one else was in the room  He acknowledged consent and understanding of privacy and security of the video platform   The patient has agreed to participate and understands they can discontinue the visit at any time  Patient is aware this is a billable service  Subjective  Kristin Howell is a 31 y o  male   Being seen by video visit for follow up chronic conditions  Patient has hypertension and hyperlipidemia he is also overweight  Patient has been working very hard with diet and exercise to lose weight  Patient states his current weight is 220 lb he is lost 11 lb  Patient states his blood pressure is normal and he is not on the lisinopril 5 mg  He is due for some lab work  Patient states he has a goal to lose the 20 lb to be 199 over the next 2-3 months  No past medical history on file  No past surgical history on file  Current Outpatient Medications   Medication Sig Dispense Refill    Multiple Vitamin (MULTIVITAMIN) tablet Take 1 tablet by mouth daily       No current facility-administered medications for this visit  No Known Allergies    Review of Systems   Constitutional: Negative for activity change, appetite change, chills, fatigue and fever  HENT: Negative for ear pain and sore throat  Eyes: Negative for visual disturbance  Respiratory: Negative for cough and shortness of breath  Cardiovascular: Negative for chest pain, palpitations and leg swelling  Gastrointestinal: Negative for abdominal pain, blood in stool, constipation, diarrhea and nausea  Genitourinary: Negative for difficulty urinating  Musculoskeletal: Negative for arthralgias, back pain and myalgias  Skin: Negative for rash  Neurological: Negative for dizziness, syncope and headaches  Psychiatric/Behavioral: Negative for sleep disturbance  Video Exam    Vitals:    03/18/21 0921 03/18/21 1131   BP: 121/84    Pulse: 69    Temp: 97 5 °F (36 4 °C)    Weight:  100 kg (220 lb 6 4 oz)   Height: 5' 6 25" (1 683 m)        Physical Exam  Constitutional:       Appearance: He is obese     HENT:      Right Ear: External ear normal       Left Ear: External ear normal    Eyes:      Conjunctiva/sclera: Conjunctivae normal    Pulmonary:      Effort: Pulmonary effort is normal    Skin:     Coloration: Skin is not pale  Findings: No erythema  I spent 14 minutes directly with the patient during this visit      VIRTUAL VISIT DISCLAIMER    Wilner Persaud acknowledges that he has consented to an online visit or consultation  He understands that the online visit is based solely on information provided by him, and that, in the absence of a face-to-face physical evaluation by the physician, the diagnosis he receives is both limited and provisional in terms of accuracy and completeness  This is not intended to replace a full medical face-to-face evaluation by the physician  Wilner Persaud understands and accepts these terms

## 2021-10-05 ENCOUNTER — TELEPHONE (OUTPATIENT)
Dept: OBGYN CLINIC | Facility: HOSPITAL | Age: 36
End: 2021-10-05

## 2021-10-14 ENCOUNTER — OFFICE VISIT (OUTPATIENT)
Dept: OBGYN CLINIC | Facility: HOSPITAL | Age: 36
End: 2021-10-14
Payer: COMMERCIAL

## 2021-10-14 VITALS
DIASTOLIC BLOOD PRESSURE: 100 MMHG | SYSTOLIC BLOOD PRESSURE: 159 MMHG | WEIGHT: 243 LBS | HEART RATE: 77 BPM | BODY MASS INDEX: 39.05 KG/M2 | HEIGHT: 66 IN

## 2021-10-14 DIAGNOSIS — G89.29 CHRONIC PAIN OF RIGHT ANKLE: Primary | ICD-10-CM

## 2021-10-14 DIAGNOSIS — M25.571 CHRONIC PAIN OF RIGHT ANKLE: Primary | ICD-10-CM

## 2021-10-14 PROCEDURE — 99213 OFFICE O/P EST LOW 20 MIN: CPT | Performed by: ORTHOPAEDIC SURGERY

## 2021-10-14 PROCEDURE — 3008F BODY MASS INDEX DOCD: CPT | Performed by: ORTHOPAEDIC SURGERY

## 2021-10-14 RX ORDER — FEXOFENADINE HCL 180 MG/1
180 TABLET ORAL DAILY
COMMUNITY

## 2021-11-08 ENCOUNTER — IMMUNIZATIONS (OUTPATIENT)
Dept: FAMILY MEDICINE CLINIC | Facility: CLINIC | Age: 36
End: 2021-11-08
Payer: COMMERCIAL

## 2021-11-08 DIAGNOSIS — Z23 ENCOUNTER FOR IMMUNIZATION: Primary | ICD-10-CM

## 2021-11-08 PROCEDURE — 90686 IIV4 VACC NO PRSV 0.5 ML IM: CPT

## 2021-11-08 PROCEDURE — 90471 IMMUNIZATION ADMIN: CPT

## 2021-12-03 ENCOUNTER — IMMUNIZATIONS (OUTPATIENT)
Dept: FAMILY MEDICINE CLINIC | Facility: HOSPITAL | Age: 36
End: 2021-12-03

## 2021-12-03 DIAGNOSIS — Z23 ENCOUNTER FOR IMMUNIZATION: Primary | ICD-10-CM

## 2021-12-03 PROCEDURE — 0064A COVID-19 MODERNA VACC 0.25 ML BOOSTER: CPT

## 2021-12-03 PROCEDURE — 91306 COVID-19 MODERNA VACC 0.25 ML BOOSTER: CPT

## 2022-04-15 ENCOUNTER — OFFICE VISIT (OUTPATIENT)
Dept: OBGYN CLINIC | Facility: CLINIC | Age: 37
End: 2022-04-15
Payer: COMMERCIAL

## 2022-04-15 VITALS — BODY MASS INDEX: 40.02 KG/M2 | HEIGHT: 66 IN | WEIGHT: 249 LBS

## 2022-04-15 DIAGNOSIS — M25.571 CHRONIC PAIN OF RIGHT ANKLE: Primary | ICD-10-CM

## 2022-04-15 DIAGNOSIS — G89.29 CHRONIC PAIN OF RIGHT ANKLE: Primary | ICD-10-CM

## 2022-04-15 PROCEDURE — 3008F BODY MASS INDEX DOCD: CPT | Performed by: ORTHOPAEDIC SURGERY

## 2022-04-15 PROCEDURE — 99213 OFFICE O/P EST LOW 20 MIN: CPT | Performed by: ORTHOPAEDIC SURGERY

## 2022-04-15 NOTE — PROGRESS NOTES
Orthopedics          Osiris Flores 30 y o  male MRN: [id number]      Chief Complaint:   right ankle pain    HPI:   30 y  o male complaining of right ankle pain  Patient has been struggling with chronic right ankle pain  Patient states he is unable to work some days due to bouts of pain in his right ankle  He states he has last for approximately 1 day  States it is relieved with ice elevation wrapping and medication  Patient notes being out of work over last 6 months 5-10 times due to his ankle pain  Patient has been performing home range of motion stretching strengthening exercises learned in physical therapy  Denies any changes numbness tingling right lower extremity                Review Of Systems:   · Skin: Normal  · Neuro: See HPI  · Musculoskeletal: See HPI  · All other systems reviewed and are negative    Past Medical History:   History reviewed  No pertinent past medical history  Past Surgical History:   History reviewed  No pertinent surgical history      Family History:  Family history reviewed and non-contributory  Family History   Problem Relation Age of Onset    Hypertension Mother     Diabetes Father     Diabetes type II Father          Social History:  Social History     Socioeconomic History    Marital status: Single     Spouse name: None    Number of children: None    Years of education: None    Highest education level: None   Occupational History    None   Tobacco Use    Smoking status: Never Smoker    Smokeless tobacco: Never Used   Substance and Sexual Activity    Alcohol use: Yes     Comment: rare    Drug use: Never    Sexual activity: None   Other Topics Concern    None   Social History Narrative    None     Social Determinants of Health     Financial Resource Strain: Not on file   Food Insecurity: Not on file   Transportation Needs: Not on file   Physical Activity: Not on file   Stress: Not on file   Social Connections: Not on file   Intimate Partner Violence: Not on file   Housing Stability: Not on file       Allergies:   No Known Allergies    Labs:  0   Lab Value Date/Time    HCT 47 5 04/16/2016 1000    HGB 16 2 04/16/2016 1000    WBC 8 8 04/16/2016 1000       Meds:    Current Outpatient Medications:     fexofenadine (ALLEGRA) 180 MG tablet, Take 180 mg by mouth daily, Disp: , Rfl:     Multiple Vitamin (MULTIVITAMIN) tablet, Take 1 tablet by mouth daily, Disp: , Rfl:       Physical Exam:   Vitals:       General Appearance:    Alert, cooperative, no distress, appears stated age   Head:    Normocephalic, without obvious abnormality, atraumatic   Eyes:    conjunctiva/corneas clear, both eyes        Nose:   Nares normal, septum midline, no drainage    Throat:   Lips normal; teeth and gums normal   Neck:    symmetrical, trachea midline, ;     thyroid:  no enlargement/   Back:     Symmetric, no curvature, ROM normal   Lungs:   No audible wheezing or labored breathing   Chest Wall:    No tenderness or deformity    Heart:    Regular rate and rhythm               Pulses:   2+ and symmetric all extremities   Skin:   Skin color, texture, turgor normal, no rashes or lesions   Neurologic:   normal strength, sensation and reflexes     throughout       Musculoskeletal: right lower extremity  Examination patient's right ankle no effusion no swelling no erythema active ankle dorsi plantar flexion inversion eversion motion equal to left ankle dorsiflexion 5° plantar flexion greater than 30° mild weakness noted with inversion as compared left lower extremity external rotation strength 5/5 dorsi plantar flexion strength 5/5 sensation tacked distal pulses present no pain on palpation medial lateral malleoli no pain palpation posteriorly peroneal tendons Achilles tendon posterior tibial tendon regions negative anterior drawer    _*_*_*_*_*_*_*_*_*_*_*_*_*_*_*_*_*_*_*_*_*_*_*_*_*_*_*_*_*_*_*_*_*_*_*_*_*_*_*_*_*    Assessment:  37 y  o male with right ankle pain chronic in nature    Plan:   · Weight bearing as tolerated  right lower extremity  · Case discussed with Dr Eri Davis  · Patient may have leave from work as needed due to right chronic ankle pain   · No surgical intervention recommended at this time regarding patient's right ankle   · Continue home range of motion stretching strengthening exercise  · Patient will follow-up with [facility] regarding further evaluation treatment of patient's chronic right ankle pain        Kendall Abbasi PA-C

## 2022-06-08 ENCOUNTER — TELEMEDICINE (OUTPATIENT)
Dept: FAMILY MEDICINE CLINIC | Facility: CLINIC | Age: 37
End: 2022-06-08
Payer: COMMERCIAL

## 2022-06-08 DIAGNOSIS — Z20.822 EXPOSURE TO COVID-19 VIRUS: Primary | ICD-10-CM

## 2022-06-08 PROCEDURE — 99214 OFFICE O/P EST MOD 30 MIN: CPT | Performed by: FAMILY MEDICINE

## 2022-06-08 PROCEDURE — U0003 INFECTIOUS AGENT DETECTION BY NUCLEIC ACID (DNA OR RNA); SEVERE ACUTE RESPIRATORY SYNDROME CORONAVIRUS 2 (SARS-COV-2) (CORONAVIRUS DISEASE [COVID-19]), AMPLIFIED PROBE TECHNIQUE, MAKING USE OF HIGH THROUGHPUT TECHNOLOGIES AS DESCRIBED BY CMS-2020-01-R: HCPCS | Performed by: FAMILY MEDICINE

## 2022-06-08 PROCEDURE — U0005 INFEC AGEN DETEC AMPLI PROBE: HCPCS | Performed by: FAMILY MEDICINE

## 2022-06-08 NOTE — ASSESSMENT & PLAN NOTE
Symptom onset: 6/5/22  Date of exposure: 6/4/22  Date of positive test: test ordered    Symptoms includes:  nasal congestion, rhinorrhea, sore throat (mild) and cough    Please maintain appropriate social distancing, wear a mask at all public spaces, wash hands frequently and clean surface after use  If you have fever greater than 104° that does not respond to Tylenol, difficulty eating or drinking, difficulty breathing please report to ER for evaluation    Per patient request, will test him, his son and daughter  Anibal Balderas and Michaela You

## 2022-06-08 NOTE — PROGRESS NOTES
Outpatient Progress Note    Assessment/Plan:    Problem List Items Addressed This Visit        Other    Exposure to COVID-19 virus - Primary     Symptom onset: 6/5/22  Date of exposure: 6/4/22  Date of positive test: test ordered    Symptoms includes:  nasal congestion, rhinorrhea, sore throat (mild) and cough    Please maintain appropriate social distancing, wear a mask at all public spaces, wash hands frequently and clean surface after use  If you have fever greater than 104° that does not respond to Tylenol, difficulty eating or drinking, difficulty breathing please report to ER for evaluation    Per patient request, will test him, his son and daughter  Tyron Rodriguez and Tiffanie Sic           Relevant Orders    COVID Only - Office Collect         Disposition:     I have spent 15 minutes directly with the patient  Encounter provider Priya Maya MD    Provider located at [address] [place] [id number] [phone]    Recent Visits  No visits were found meeting these conditions  Showing recent visits within past 7 days and meeting all other requirements  Today's Visits  Date Type Provider Dept   06/08/22 Telemedicine MD Juan Antonio Jackson   Showing today's visits and meeting all other requirements  Future Appointments  No visits were found meeting these conditions  Showing future appointments within next 150 days and meeting all other requirements     This virtual check-in was done via [facility] and patient was informed that this is a secure, HIPAA-compliant platform  He agrees to proceed  Patient agrees to participate in a virtual check in via telephone or video visit instead of presenting to the office to address urgent/immediate medical needs  Patient is aware this is a billable service  After connecting through [facility], the patient was identified by name and date of birth   Antoine Power was informed that this was a telemedicine visit and that the exam was being conducted confidentially over secure lines  My office door was closed  No one else was in the room  Jimbo Roblero acknowledged consent and understanding of privacy and security of the telemedicine visit  I informed the patient that I have reviewed his record in Epic and presented the opportunity for him to ask any questions regarding the visit today  The patient agreed to participate  Verification of patient location:  Patient is located in the following state in which I hold an active license: [place]    Subjective:   Jimbo Roblero is a 30 y o  male who is concerned about COVID-19  Patient's symptoms include nasal congestion, rhinorrhea, sore throat (mild) and cough  Patient denies fever, chills, fatigue, malaise, anosmia, loss of taste, shortness of breath, chest tightness, abdominal pain, nausea, vomiting, diarrhea, myalgias and headaches       - Date of symptom onset: 6/5/2022      COVID-19 vaccination status: Fully vaccinated (primary series)    Exposure:   Contact with a person who is under investigation (PUI) for or who is positive for COVID-19 within the last 14 days?: Yes    Hospitalized recently for fever and/or lower respiratory symptoms?: No      Currently a healthcare worker that is involved in direct patient care?: No      Works in a special setting where the risk of COVID-19 transmission may be high? (this may include long-term care, correctional and half-way facilities; homeless shelters; assisted-living facilities and group homes ): No      Resident in a special setting where the risk of COVID-19 transmission may be high? (this may include long-term care, correctional and half-way facilities; homeless shelters; assisted-living facilities and group homes ): No      COVID case at job  Kids have similar symptoms      Lab Results   Component Value Date    SARSCOV2 NOT DETECTED 04/05/2022    [facility] [address] [facility] Detected (A) 01/09/2022     No past medical history on file  No past surgical history on file  Current Outpatient Medications   Medication Sig Dispense Refill    fexofenadine (ALLEGRA) 180 MG tablet Take 180 mg by mouth daily      Multiple Vitamin (MULTIVITAMIN) tablet Take 1 tablet by mouth daily       No current facility-administered medications for this visit  No Known Allergies    Review of Systems   Constitutional: Negative for chills, fatigue and fever  HENT: Positive for congestion, rhinorrhea and sore throat (mild)  Respiratory: Positive for cough  Negative for chest tightness and shortness of breath  Gastrointestinal: Negative for abdominal pain, diarrhea, nausea and vomiting  Musculoskeletal: Negative for myalgias  Neurological: Negative for headaches  Objective: There were no vitals filed for this visit  Physical Exam  Pulmonary:      Effort: Pulmonary effort is normal    Neurological:      Mental Status: He is alert  Psychiatric:         Mood and Affect: Mood normal          VIRTUAL VISIT DISCLAIMER    Michael Colindres verbally agrees to participate in [employer]  Pt is aware that [employer] could be limited without vital signs or the ability to perform a full hands-on physical Nael Hopkins understands he or the provider may request at any time to terminate the video visit and request the patient to seek care or treatment in person

## 2022-06-08 NOTE — LETTER
June 8, 2022     Patient: Kia Hurtado  YOB: 1985  Date of Visit: 6/8/2022      To Whom it May Concern:    Kia Hurtado is under my professional care  Venkata Hwang was seen in my office on 6/8/2022  Edwar {Return to school/sport/work:[id number]}  If you have any questions or concerns, please don't hesitate to call           Sincerely,          Ezequiel Garcia MD        CC: No Recipients

## 2022-06-09 LAB — SARS-COV-2 RNA RESP QL NAA+PROBE: NEGATIVE

## 2022-09-28 ENCOUNTER — APPOINTMENT (OUTPATIENT)
Dept: RADIOLOGY | Facility: MEDICAL CENTER | Age: 37
End: 2022-09-28
Payer: COMMERCIAL

## 2022-09-28 ENCOUNTER — OFFICE VISIT (OUTPATIENT)
Dept: OBGYN CLINIC | Facility: MEDICAL CENTER | Age: 37
End: 2022-09-28
Payer: COMMERCIAL

## 2022-09-28 VITALS
BODY MASS INDEX: 40.18 KG/M2 | HEIGHT: 66 IN | WEIGHT: 250 LBS | SYSTOLIC BLOOD PRESSURE: 161 MMHG | DIASTOLIC BLOOD PRESSURE: 110 MMHG | HEART RATE: 89 BPM

## 2022-09-28 DIAGNOSIS — M25.571 CHRONIC PAIN OF RIGHT ANKLE: ICD-10-CM

## 2022-09-28 DIAGNOSIS — G89.29 CHRONIC PAIN OF RIGHT ANKLE: ICD-10-CM

## 2022-09-28 DIAGNOSIS — M25.561 ACUTE PAIN OF RIGHT KNEE: Primary | ICD-10-CM

## 2022-09-28 DIAGNOSIS — M25.561 CHRONIC PAIN OF RIGHT KNEE: ICD-10-CM

## 2022-09-28 DIAGNOSIS — G89.29 CHRONIC PAIN OF RIGHT KNEE: ICD-10-CM

## 2022-09-28 DIAGNOSIS — M22.2X1 PATELLOFEMORAL PAIN SYNDROME OF RIGHT KNEE: ICD-10-CM

## 2022-09-28 PROCEDURE — 73610 X-RAY EXAM OF ANKLE: CPT

## 2022-09-28 PROCEDURE — 99215 OFFICE O/P EST HI 40 MIN: CPT | Performed by: STUDENT IN AN ORGANIZED HEALTH CARE EDUCATION/TRAINING PROGRAM

## 2022-09-28 PROCEDURE — 73564 X-RAY EXAM KNEE 4 OR MORE: CPT

## 2022-09-28 NOTE — LETTER
September 28, 2022     Patient: Felix June  YOB: 1985  Date of Visit: 9/28/2022      To Whom it May Concern:    Felix June is under my professional care  Leonie Juanjo was seen in my office on 9/28/2022  In regards to his right ankle flare ups, he is restricted up to 8 hours for 3x/week as needed; as described in [facility] paperwork  If you have any questions or concerns, please don't hesitate to call           Sincerely,          Jarad Haywood MD        CC: Felix Soic

## 2022-09-28 NOTE — PROGRESS NOTES
1  Acute pain of right knee  XR knee 4+ vw right injury    Ambulatory Referral to Physical Therapy   2  Chronic pain of right ankle  XR ankle 3+ vw right    Ambulatory Referral to Physical Therapy   3  Patellofemoral pain syndrome of right knee  Ambulatory Referral to Physical Therapy     Orders Placed This Encounter   Procedures    XR knee 4+ vw right injury    XR ankle 3+ vw right    Ambulatory Referral to Physical Therapy        Imaging Studies (I personally reviewed images in PACS and report):     X-ray right knee 09/28/2022:  No acute osseous abnormalities  No significant degenerative changes  No joint effusion   X-ray right ankle 09/20/2022:  No acute osseous abnormalities  Mortise intact  Calcaneal in enthesophye at the insertion of Achilles tendon  IMPRESSION:  · Acute right knee pain - ongoing for the past couple days without a precipitating injury  Radiographs unremarkable  Clinically consistent with patellofemoral pain syndrome  · Chronic right ankle pain - patient reports has been an ongoing issue for 10+ years since a football injury and having weak ankles    He states that there is an intra-articular loose body in his ankle that was only seen on a prior MRI  He states there was discussion for a surgical intervention at some point but that he decided to defer this option and has been having yearly [facility] paperwork with restrictions as per communications  I cannot confirm any prior MRIs other than an x-ray of his ankle from 2016  He may have seen an outside of our health network but I cannot confirm this  Other factors:   BMI 40    PLAN:     Clinical exam and radiographic imaging reviewed with patient today, with impression as per above  I have discussed with the patient the pathophysiology of this diagnosis and reviewed how the examination correlates with this diagnosis   Imaging obtained of his right knee and right ankle today as noted above    Will follow-up official radiology interpretation   In regards to his right knee pain, I recommended conservative treatment at this time  I referred him to formal physical therapy for his right knee as well as his right ankle  I recommended a minimum of 4-6 weeks before transitioning to a home exercise program   In regards to pain control recommended p r n  Use of NSAIDs, acetaminophen, heat/ice therapy 20 minutes on/off   In regards to his right ankle pain, I obtained new imaging of his right ankle as noted above  I cannot confirm some of the ongoing issues that have caused patient to have chronic ankle pain for several years  I recommended that in addition to the physical therapy for his right knee that he also undergo formal physical therapy for his right ankle as well to facilitate recovery  After at least attending at least 4 weeks of physical therapy, I will consider obtaining an MRI of his right ankle without contrast for further evaluation   I have provided a work note today as per communications  On chart review, there is prior [place] paperwork with restrictions  I will continue these restrictions at this time while working on conservative treatments and will have our team complete his FMLA paperwork  Patient will need to bring in the [employer] [facility] [employer] [facility] paperwork as well  I counseled the paperwork intake 10-14 business stays in order to complete  Return in about 8 weeks (around 11/23/2022)  Portions of the record may have been created with voice recognition software  Occasional wrong word or "sound a like" substitutions may have occurred due to the inherent limitations of voice recognition software  Read the chart carefully and recognize, using context, where substitutions have occurred       I have spent 60 minutes with Patient  today in which greater than 50% of this time was spent in counseling/coordination of care regarding Diagnostic results, Prognosis, Risks and benefits of tx options, Intructions for management, Patient and family education, Importance of tx compliance, Risk factor reductions, Impressions and obtaining/interpreting new imaging, reviewing prior charts  CHIEF COMPLAINT:  Chief Complaint   Patient presents with    Right Ankle - Pain         HPI:  Natividad Hatch is a 30 y o  male  who presents for       Visit 9/28/2022 :  Initial evaluation of right ankle pain:  Patient reports this has been an ongoing issue for 10+ years from a football injury  He states he also has a history of "weak ankles" in general  He states that he had a workup from a prior provider in which an MRI was obtained  He states that he was offered a surgical intervention for a intra-articular loose body but that patient ended up deferring this option  He states ever since this workup, he has been having flare-ups of his right ankle pain and has [place] paperwork allowing him restricted hours for these flare-ups of pain of his ankle  He states when he has pain of his ankle it is over the medial lateral aspects he describes as a sharp/aching pain of moderate to severe intensity in which he has difficulty weight-bearing on his right lower extremity  He states his ankles can swell up  He denies any discoloration  Reports intermittent tingling sensation of his ankle  He states with interventions such as NSAIDs, elevation, icing his pain does eventually resolved  Thus, upon chart review, he has had FMLA paperwork restricting him for 8 hours a day for 3 times a week to be excused from work as needed for ankle pain flares  He is asking for an updated FMLA form as it is expiring  He states that he did see physical therapy for this issue, but this was several years ago  Of note, I cannot confirm the extent of his injury, treatment plan, imaging studies (other than an ankle X-ray from 2016) of his right ankle based on chart review today  Initial evaluation of right knee pain:  This is a separate issue from his right ankle pain    He states this has been ongoing for the past couple days  No precipitating injury  He states he works in the [employer] in which he sits for a prolonged period of time  He states while sitting he noticed a progressive knee aching pain over the inferolateral aspect of his patella  He states that his knee was getting locking up    Last reports initial swelling but this has resolved  He states the symptoms have been improving but not completely resolved  He states the pain can be aggravated from prolonged sitting or range of motion movements  He reports intermittent crepitus of his knees  Denies prior injuries or surgeries of his right knee in the past     Medical, Surgical, Family, and Social History    History reviewed  No pertinent past medical history  History reviewed  No pertinent surgical history  Social History   Social History     Substance and Sexual Activity   Alcohol Use Yes    Comment: rare     Social History     Substance and Sexual Activity   Drug Use Never     Social History     Tobacco Use   Smoking Status Never Smoker   Smokeless Tobacco Never Used     Family History   Problem Relation Age of Onset    Hypertension Mother     Diabetes Father     Diabetes type II Father      No Known Allergies       Physical Exam  BP (!) 161/110   Pulse 89   Ht 5' 6" (1 676 m)   Wt 113 kg (250 lb)   BMI 40 35 kg/m²     Constitutional:  see vital signs  Gen: obese, normocephalic/atraumatic, well-groomed  Eyes: No inflammation or discharge of conjunctiva or lids; sclera clear   Pharynx: no inflammation, lesion, or mass of lips  Neck: supple, no masses, non-distended  MSK: no inflammation, lesion, mass, or clubbing of nails and digits except for other than mentioned below  SKIN: no visible rashes or skin lesions  Pulmonary/Chest: Effort normal  No respiratory distress     NEURO: cranial nerves grossly intact  PSYCH:  Alert and oriented to person, place, and time; recent and remote memory intact; mood normal, no depression, anxiety, or agitation, judgment and insight good and intact     Ortho Exam  Right Knee Exam:  Erythema: no  Swelling: no  Increased Warmth: no  Tenderness: +inferomedial/inferolateral aspects of the patella  ROM: 0-130 (full extension and flexion beyond 90 aggravates brenda-patellar knee pain)  Knee flexion strength: 5/5  Knee extension strength: 5/5  Patellar compression: + pain  Patellar Grind: +  Lachman's: negative  Varus laxity: negative  Valgus laxity: negative     Ankle Examination (focused):     Gait: slight antalgia      RIGHT   Inspection Erythema none    Edema none    Ecchymosis none        ROM:  Plantarflexion 30    Dorsiflexion 10        Strength Pronation 5-/5    Supination 5-/5    Foot plantarflexion 5/5    Foot dorsflexion 5/5        TTP AiTFL no    ATFL no    CFL no    PTFL no    Achilles no    Deltoid no    Peroneal no    Tib Ant no    Tib Post no        TTP (Bony) Prox Fibula no    Lat Malleolus no    Base of 5th MT no    Med Malleolus +    Navicular no    Talar Dome no        Anterior Drawer ATFL Negative laxity, but reported +ankle pain   Calcaneal Squeeze  negative   Tib-Fib Squeeze Test  negative   Talar Tilt (stab tib,DF foot,invert foot) CFL Negative laxity, but reported +ankle pain   ER Stress (stab tib,ER foot) High ankle negative   Eversion stress (stab tib, jermaine foot) deltoid negative   MT Compression  negative         No calf tenderness to palpation     LE NV Exam: +2 DP/PT pulses   Sensation intact to light touch   Flexion/extension of toes intact              Procedures

## 2022-09-29 ENCOUNTER — TELEPHONE (OUTPATIENT)
Dept: OBGYN CLINIC | Facility: HOSPITAL | Age: 37
End: 2022-09-29

## 2022-09-29 NOTE — TELEPHONE ENCOUNTER
Patient called to confirm we receive the disability form    And making sure his restriction match is work note from the date he was seen

## 2022-10-06 ENCOUNTER — TELEPHONE (OUTPATIENT)
Dept: OBGYN CLINIC | Facility: CLINIC | Age: 37
End: 2022-10-06

## 2022-10-06 NOTE — TELEPHONE ENCOUNTER
Caller: Beltran Mathews    Doctor/Office: n/a    Caller asked to speak to: Physical Therapy      Call was transferred to: Physical Therapy

## 2022-10-28 ENCOUNTER — EVALUATION (OUTPATIENT)
Dept: PHYSICAL THERAPY | Facility: MEDICAL CENTER | Age: 37
End: 2022-10-28
Payer: COMMERCIAL

## 2022-10-28 DIAGNOSIS — M25.561 ACUTE PAIN OF RIGHT KNEE: Primary | ICD-10-CM

## 2022-10-28 DIAGNOSIS — M22.2X1 PATELLOFEMORAL PAIN SYNDROME OF RIGHT KNEE: ICD-10-CM

## 2022-10-28 DIAGNOSIS — G89.29 CHRONIC PAIN OF RIGHT ANKLE: ICD-10-CM

## 2022-10-28 DIAGNOSIS — M25.571 CHRONIC PAIN OF RIGHT ANKLE: ICD-10-CM

## 2022-10-28 PROCEDURE — 97161 PT EVAL LOW COMPLEX 20 MIN: CPT | Performed by: PHYSICAL THERAPIST

## 2022-10-28 NOTE — PROGRESS NOTES
PT Evaluation     Today's date: 10/28/2022  Patient name: Eduard Stockton  : 1985  MRN: [id number]  Referring provider: Leonel Aguilera MD  Dx:   Encounter Diagnosis     ICD-10-CM    1  Acute pain of right knee  M25 561 Ambulatory Referral to Physical Therapy   2  Chronic pain of right ankle  M25 571     G89 29    3  Patellofemoral pain syndrome of right knee  M22 2X1        Start Time: 1330  Stop Time: 1420  Total time in clinic (min): 50 minutes    Assessment  Assessment details: Pt is a 30 y o male who presents with increased R ankle and R knee pain, decreased R knee and ankle ROM, decreased R LE strength and decreased activity tolerance  These impairments limit the patient from participating in exercising at [employer], decreased tolerance to work related exercises and decreased ability to complete house and yard work  I believe this patient is a good candidate for and will benefit from skilled physical therapy for manual therapy to the ankle and knee, LE strengthening exercises, ROM exercises for the R ankle and knee and mechanics training to improve limitations and assist the patient to return to [place]  Thank you for the opportunity to participate in [facility]      Positive Prognostic Indicators: desire to improve    Negative Prognostic Indicators: insurance  Impairments: abnormal gait, abnormal muscle tone, abnormal or restricted ROM, abnormal movement, activity intolerance, impaired physical strength, lacks appropriate home exercise program, pain with function and poor posture     Symptom irritability: lowUnderstanding of Dx/Px/POC: good   Prognosis: good    Goals  STGs: 4 weeks  1) Pt will have SPR decrease of 2 units at worst for both body parts  2) pt will have improved R knee extension strength to 5/5  3) pt will have improved foto score of 10 points  4) pt will have improved improved R ankle DF AROM by 5*    LTGs: 8 weeks  1) pt will be independent with HEP by D/C  2) pt will be independent with symptom management by D/C  3) pt will subjectively report improved overall symptoms by at least 50% with functional activity in order to progress towards regular exercise by DC  Plan  Patient would benefit from: skilled physical therapy  Planned modality interventions: cryotherapy and thermotherapy: hydrocollator packs  Planned therapy interventions: joint mobilization, manual therapy, neuromuscular re-education, patient education, postural training, strengthening, stretching, therapeutic activities, therapeutic exercise, home exercise program, gait training and functional ROM exercises  Frequency: 1x week  Duration in weeks: 12  Plan of Care beginning date: 10/28/2022  Plan of Care expiration date: 1/20/2023  Treatment plan discussed with: patient        Subjective Evaluation    History of Present Illness  Mechanism of injury: Subjective Comments: pt reports chronic ankle pain  He reports there is a bone spur that causes increased pain primarily  He reports that his ankles are weak  MD recommended patient try PT for his ankle symptoms  Pt also reports that he has a new issue where his R knee swelled up  Pt reports that he has increased difficulty walking with increased pain  Pt notes that symptoms began with prolonged sitting  Pt reports that he used to be an avid runner with some ankle soreness but not debilitated  Pt also acknowledges that he could lose some weight where he knows some of his pain is due to this  Pain ankle   Rest: 2-3/10   Best: 0/10   Worst: 8-9/10    Pain Knee   Rest: 0/10   Best: 0/10   Worst: 7-8/10    Relieving Factors: rest ice and elevate (ankle), heat and ice for the knee and moving the knee    Exacerbating Factors: weight bearing and increased activity for the ankle  Sedentary for the knee    Home Set-up: stairs are difficult       Work/Hobbies: jada, 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       Previous Treatment: n/a    Goals:  Improve pain, improve flexibility and return to exercising regularly  Objective     Tenderness     Right Knee   Tenderness in the lateral joint line and patellar tendon  Right Ankle/Foot   Tenderness in the Achilles insertion  Active Range of Motion   Left Knee   Flexion: 128 degrees   Extension: 0 degrees     Right Knee   Flexion: 120 degrees   Left Ankle/Foot   Dorsiflexion (ke): 5 degrees   Plantar flexion: 55 degrees   Inversion: 20 degrees   Eversion: 25 degrees     Right Ankle/Foot   Dorsiflexion (ke): 0 degrees   Plantar flexion: 60 degrees   Inversion: 15 degrees   Eversion: 2 degrees     Passive Range of Motion   Left Knee   Flexion: 130 degrees   Extension: 0 degrees     Right Knee   Flexion: 122 degrees     Right Ankle/Foot    Dorsiflexion (ke): 5 degrees with pain  Plantar flexion: 60 degrees with pain  Inversion: 20 degrees   Eversion: 15 degrees with pain    Strength/Myotome Testing     Left Hip   Planes of Motion   Flexion: 4+  Abduction: 5  Adduction: 5    Right Hip   Planes of Motion   Flexion: 4+  Abduction: 5  Adduction: 5    Left Knee   Flexion: 5  Extension: 5    Right Knee   Flexion: 4  Extension: 4+    Left Ankle/Foot   Dorsiflexion: 5  Plantar flexion: 5  Inversion: 5  Eversion: 5    Right Ankle/Foot   Dorsiflexion: 4+  Plantar flexion: 4+  Inversion: 4+  Eversion: 5    Tests     Right Knee   Negative anterior drawer, posterior drawer, valgus stress test at 0 degrees, valgus stress test at 30 degrees, varus stress test at 0 degrees and varus stress test at 30 degrees       Ambulation     Ambulation: Level Surfaces   Ambulation without assistive device: independent             Precautions: HTN, BMI      Manuals 10/28                                                                Neuro Re-Ed             SLR x10            SAQ             LAQ x10            bridges             SLS             Tandem stance                          Ther Ex             Calf stretch 30"x3            Heel slide 10"x10 Bike/elliptical             Ankle band 4 way gtb x10 ea                                                                  Ther Activity                                       Gait Training                                       Modalities

## 2022-11-04 ENCOUNTER — APPOINTMENT (OUTPATIENT)
Dept: PHYSICAL THERAPY | Facility: MEDICAL CENTER | Age: 37
End: 2022-11-04

## 2022-11-11 ENCOUNTER — OFFICE VISIT (OUTPATIENT)
Dept: PHYSICAL THERAPY | Facility: MEDICAL CENTER | Age: 37
End: 2022-11-11

## 2022-11-11 DIAGNOSIS — G89.29 CHRONIC PAIN OF RIGHT ANKLE: Primary | ICD-10-CM

## 2022-11-11 DIAGNOSIS — M25.561 ACUTE PAIN OF RIGHT KNEE: ICD-10-CM

## 2022-11-11 DIAGNOSIS — M25.571 CHRONIC PAIN OF RIGHT ANKLE: Primary | ICD-10-CM

## 2022-11-11 DIAGNOSIS — M22.2X1 PATELLOFEMORAL PAIN SYNDROME OF RIGHT KNEE: ICD-10-CM

## 2022-11-11 NOTE — PROGRESS NOTES
Daily Note     Today's date: 2022  Patient name: Adam Lee  : 1985  MRN: [id number]  Referring provider: Juan C Strauss MD  Dx:   Encounter Diagnosis     ICD-10-CM    1  Chronic pain of right ankle  M25 571     G89 29    2  Patellofemoral pain syndrome of right knee  M22 2X1    3  Acute pain of right knee  M25 561        Start Time: 1238  Stop Time: 1320  Total time in clinic (min): 42 minutes    Subjective: pt reports he is compliant with HEP but is performing exercises a couple times a week  He reports that he had a flare last week where he could not put weight on his R ankle  He reports this happens occasionally but has not happened in a while  Objective: See treatment diary below      Assessment: Tolerated treatment well  Pt completed all exercises with good tolerance and form  Pt encouraged to complete HEP more frequently (2-3 times a day) in order to improve R ankle flexibility  HEP progressed with with additional ankle ROM and LE strengthening exercises  We will continue to progress as tolerated  Patient would benefit from continued PT      Plan: Continue per plan of care  Precautions: HTN, BMI      Manuals 10/28 11/11                                                               Neuro Re-Ed             SLR x10 2x10           SAQ             LAQ x10            bridges             SLS             Tandem stance             FTEC balance  30"x3           Ther Ex             Calf stretch 30"x3 30"x3 KB and KE           Heel slide 10"x10 10"x10           Bike/elliptical  Bike 5'           Ankle band 4 way gtb x10 ea  btb x20 ea                                                                 Ther Activity                                       Gait Training                                       Modalities

## 2022-11-18 ENCOUNTER — APPOINTMENT (OUTPATIENT)
Dept: PHYSICAL THERAPY | Facility: MEDICAL CENTER | Age: 37
End: 2022-11-18

## 2023-01-11 ENCOUNTER — OFFICE VISIT (OUTPATIENT)
Dept: FAMILY MEDICINE CLINIC | Facility: CLINIC | Age: 38
End: 2023-01-11

## 2023-01-11 VITALS
OXYGEN SATURATION: 99 % | BODY MASS INDEX: 39.86 KG/M2 | HEIGHT: 66 IN | DIASTOLIC BLOOD PRESSURE: 96 MMHG | RESPIRATION RATE: 16 BRPM | SYSTOLIC BLOOD PRESSURE: 144 MMHG | WEIGHT: 248 LBS | TEMPERATURE: 98.1 F | HEART RATE: 118 BPM

## 2023-01-11 DIAGNOSIS — J06.9 VIRAL UPPER RESPIRATORY TRACT INFECTION: Primary | ICD-10-CM

## 2023-01-11 RX ORDER — GUAIFENESIN 600 MG/1
1200 TABLET, EXTENDED RELEASE ORAL EVERY 12 HOURS SCHEDULED
Qty: 56 TABLET | Refills: 0 | Status: SHIPPED | OUTPATIENT
Start: 2023-01-11 | End: 2023-01-25

## 2023-01-11 RX ORDER — PREDNISONE 20 MG/1
20 TABLET ORAL DAILY
Qty: 5 TABLET | Refills: 0 | Status: SHIPPED | OUTPATIENT
Start: 2023-01-11 | End: 2023-01-16

## 2023-01-11 RX ORDER — PREDNISONE 20 MG/1
40 TABLET ORAL DAILY
Qty: 5 TABLET | Refills: 0 | Status: SHIPPED | OUTPATIENT
Start: 2023-01-11 | End: 2023-01-11

## 2023-01-11 NOTE — PROGRESS NOTES
Outpatient Progress Note    Assessment/Plan:    Problem List Items Addressed This Visit        Respiratory    Viral upper respiratory tract infection - Primary     Patient with 3-day history of URI symptoms, had a positive fever, 101 1  No other sick contacts  Fever is resolving  We will start patient on Mucinex for decongestion  Patient may take half a tablet of Benadryl at night to help with decongestion sleep  You may take Tylenol 325 mg up to 2 tablets every 6 hours  Warm water with honey may help soothe the back of your throat and prevent irritation from postnasal drip  If there is no significant provement in 7 days, patient may use short course of steroid, 20 mg for total 5 days for laryngitis/pharyngitis           Relevant Medications    guaiFENesin (MUCINEX) 600 mg 12 hr tablet    predniSONE 20 mg tablet      Disposition:     I have spent 10 minutes directly with the patient  Encounter provider: Merlin Emperor, MD     Provider located at: [address] [place] [id number] [phone]     Recent Visits  No visits were found meeting these conditions  Showing recent visits within past 7 days and meeting all other requirements  Today's Visits  Date Type Provider Dept   01/11/23 Office Visit Merlin Emperor, MD [facility]   Showing today's visits and meeting all other requirements  Future Appointments  No visits were found meeting these conditions  Showing future appointments within next 150 days and meeting all other requirements     Subjective:   Beltran Mathews is a 30 y o  male who is concerned about COVID-19  Patient's symptoms include fever (101 1), fatigue, malaise, rhinorrhea, sore throat, cough (occasional), myalgias and headache  Patient denies chills, congestion, anosmia, loss of taste, shortness of breath, chest tightness, abdominal pain, nausea, vomiting and diarrhea       - Date of symptom onset: 1/9/2023      COVID-19 vaccination status: Fully vaccinated (primary series)    Exposure:   Contact with a person who is under investigation (PUI) for or who is positive for COVID-19 within the last 14 days?: No    Hospitalized recently for fever and/or lower respiratory symptoms?: No      Currently a healthcare worker that is involved in direct patient care?: No      Works in a special setting where the risk of COVID-19 transmission may be high? (this may include long-term care, correctional and half-way facilities; homeless shelters; assisted-living facilities and group homes ): No      Resident in a special setting where the risk of COVID-19 transmission may be high? (this may include long-term care, correctional and half-way facilities; homeless shelters; assisted-living facilities and group homes ): No      Maintaining adequate oral intake, more difficult swallowing but food not getting stuck  Hot flushes and sweating, difficulty falling asleep  Slight improvement in symptom       Lab Results   Component Value Date    SARSCOV2 Negative 09/12/2022    Keysha Chakraborty Detected (A) 01/09/2022       Review of Systems   Constitutional: Positive for fatigue and fever (101 1)  Negative for chills  HENT: Positive for rhinorrhea and sore throat  Negative for congestion  Respiratory: Positive for cough (occasional)  Negative for chest tightness and shortness of breath  Gastrointestinal: Negative for abdominal pain, diarrhea, nausea and vomiting  Musculoskeletal: Positive for myalgias  Neurological: Positive for headaches       Current Outpatient Medications on File Prior to Visit   Medication Sig   • fexofenadine (ALLEGRA) 180 MG tablet Take 180 mg by mouth daily   • Multiple Vitamin (MULTIVITAMIN) tablet Take 1 tablet by mouth daily     Objective:    /96 (BP Location: Left arm, Patient Position: Sitting, Cuff Size: Large)   Pulse (!) 118   Temp 98 1 °F (36 7 °C) (Temporal)   Resp 16   Ht 5' 6" (1 676 m)   Wt 112 kg (248 lb)   SpO2 99%   BMI 40 03 kg/m²      Physical Exam  Vitals reviewed  Constitutional:       General: He is not in acute distress  Appearance: Normal appearance  He is not ill-appearing, toxic-appearing or diaphoretic  HENT:      Head: Normocephalic  Comments: Fullness in the sinuses      Right Ear: Tympanic membrane, ear canal and external ear normal  There is no impacted cerumen  Left Ear: Tympanic membrane, ear canal and external ear normal  There is no impacted cerumen  Nose: No congestion  Mouth/Throat:      Mouth: Mucous membranes are moist       Comments: Copious amount of postnasal drip  Eyes:      Pupils: Pupils are equal, round, and reactive to light  Cardiovascular:      Rate and Rhythm: Normal rate and regular rhythm  Pulses: Normal pulses  Heart sounds: Normal heart sounds  No murmur heard  Pulmonary:      Effort: Pulmonary effort is normal  No respiratory distress  Breath sounds: Normal breath sounds  Abdominal:      General: Abdomen is flat  Musculoskeletal:         General: No swelling, deformity or signs of injury  Normal range of motion  Skin:     General: Skin is warm and dry  Capillary Refill: Capillary refill takes less than 2 seconds  Coloration: Skin is not jaundiced  Neurological:      General: No focal deficit present  Mental Status: He is alert and oriented to person, place, and time     Psychiatric:         Mood and Affect: Mood normal             Leida Fulton MD

## 2023-01-11 NOTE — ASSESSMENT & PLAN NOTE
Patient with 3-day history of URI symptoms, had a positive fever, 101 1  No other sick contacts  Fever is resolving  We will start patient on Mucinex for decongestion  Patient may take half a tablet of Benadryl at night to help with decongestion sleep  You may take Tylenol 325 mg up to 2 tablets every 6 hours  Warm water with honey may help soothe the back of your throat and prevent irritation from postnasal drip  If there is no significant provement in 7 days, patient may use short course of steroid, 20 mg for total 5 days for laryngitis/pharyngitis

## 2023-01-16 ENCOUNTER — TELEPHONE (OUTPATIENT)
Dept: FAMILY MEDICINE CLINIC | Facility: CLINIC | Age: 38
End: 2023-01-16

## 2023-01-16 NOTE — TELEPHONE ENCOUNTER
Spoke with Mina Brito  He needed to contact [facility] from [employer] regarding his FMLA paperwork  I sent a message to her to contact Mina Brito   Pt informed I sent the message

## 2023-01-17 ENCOUNTER — OFFICE VISIT (OUTPATIENT)
Dept: OBGYN CLINIC | Facility: MEDICAL CENTER | Age: 38
End: 2023-01-17

## 2023-01-17 VITALS
SYSTOLIC BLOOD PRESSURE: 165 MMHG | DIASTOLIC BLOOD PRESSURE: 108 MMHG | BODY MASS INDEX: 39.86 KG/M2 | WEIGHT: 248 LBS | HEART RATE: 108 BPM | HEIGHT: 66 IN

## 2023-01-17 DIAGNOSIS — M25.471 RIGHT ANKLE EFFUSION: ICD-10-CM

## 2023-01-17 DIAGNOSIS — M10.9 ACUTE GOUT OF RIGHT ANKLE, UNSPECIFIED CAUSE: ICD-10-CM

## 2023-01-17 DIAGNOSIS — M25.571 ACUTE RIGHT ANKLE PAIN: Primary | ICD-10-CM

## 2023-01-17 LAB
CRYSTALS SNV QL MICRO: NORMAL
RBC # SNV MANUAL: 8000 /UL (ref 0–10)

## 2023-01-17 RX ORDER — COLCHICINE 0.6 MG/1
0.6 TABLET ORAL DAILY
Qty: 3 TABLET | Refills: 0 | Status: SHIPPED | OUTPATIENT
Start: 2023-01-17 | End: 2023-01-24

## 2023-01-17 NOTE — LETTER
January 17, 2023     Patient: Jamaal Peres  YOB: 1985  Date of Visit: 1/17/2023      To Whom it May Concern:    Jamaal Peres is under my professional care  Jovita Murray was seen in my office on 1/17/2023  Patient restricted from standing on his right lower extremity until re-evaluation with me in approximately 1 week  If you have any questions or concerns, please don't hesitate to call           Sincerely,          Corwin Falcon MD        CC: Jamaal Peres

## 2023-01-17 NOTE — PROGRESS NOTES
1  Acute right ankle pain  colchicine (COLCRYS) 0 6 mg tablet    Body fluid culture and Gram stain    RBC count,Synovial Fluid    Synovial fluid white cell count w/ diff    Synovial fluid, crystal    Lyme disease, PCR    Body fluid culture and Gram stain    RBC count,Synovial Fluid    Synovial fluid white cell count w/ diff    Synovial fluid, crystal    Lyme disease, PCR    Medium joint arthrocentesis: R ankle      2  Right ankle effusion  colchicine (COLCRYS) 0 6 mg tablet    Body fluid culture and Gram stain    RBC count,Synovial Fluid    Synovial fluid white cell count w/ diff    Synovial fluid, crystal    Lyme disease, PCR    Body fluid culture and Gram stain    RBC count,Synovial Fluid    Synovial fluid white cell count w/ diff    Synovial fluid, crystal    Lyme disease, PCR    Medium joint arthrocentesis: R ankle      3  Acute gout of right ankle, unspecified cause  colchicine (COLCRYS) 0 6 mg tablet    Medium joint arthrocentesis: R ankle        Orders Placed This Encounter   Procedures   • Medium joint arthrocentesis: R ankle   • Body fluid culture and Gram stain   • RBC count,Synovial Fluid   • Synovial fluid white cell count w/ diff   • Synovial fluid, crystal   • Lyme disease, PCR        Imaging Studies (I personally reviewed images in PACS and report):    • X-ray right knee 09/28/2022:  No acute osseous abnormalities  No significant degenerative changes  No joint effusion  • X-ray right ankle 09/20/2022:  No acute osseous abnormalities  Mortise intact  Calcaneal in enthesophye at the insertion of Achilles tendon  IMPRESSION:  · Acute on chronic right ankle pain  · Currently undergoing a flare of pain over the past 2 days- right ankle effusion/erythema as pictured below; unable to tolerated weightbearing on RLE   Hyperalgesic to light touch  · No precipitating injury  · Ongoing issue 10+ years  · DDx: gout, pseudogout, lyme    Other factors:  • BMI 40    PLAN:    • Clinical exam and radiographic imaging reviewed with patient today, with impression as per above  • Given his clinical exam, I attempted an aspiration of his right intra-articular ankle as noted below  I was only able to get a limited amount of aspiration of 5 cc (cloudy fluid) which I will send for crystals and Lyme as I do not suspect an ongoing infection at this time given this is a recurrent issue over the past 10+ years  • For now, I will treat for now as suspected gout flare and prescribe colchicine 0 6 mg 2 tabs p o  now and 1 tab p o  1 hour later  Can continue as needed use of acetaminophen, NSAIDs, heat/ice therapy  If this does porived relief of symptoms - patient may need to consider a gout prophylaxis treatment course  • I will make a close follow-up within 1 week to reevaluate his symptoms  • I have provided a work accommodations note in the interim as per communications  Return in about 1 week (around 1/24/2023)  Portions of the record may have been created with voice recognition software  Occasional wrong word or "sound a like" substitutions may have occurred due to the inherent limitations of voice recognition software  Read the chart carefully and recognize, using context, where substitutions have occurred  CHIEF COMPLAINT:  Chief Complaint   Patient presents with   • Right Ankle - Follow-up         HPI:  Jovi Ramirez is a 41 y o  male  who presents for     Visit 1/17/2023: Follow up right ankle pain:  Patient reportedly had an acute flareup of right ankle pain, swelling, erythema over the past 2 days  Denies any precipitating injury  Pain has become so severe that he cannot tolerate weightbearing on his right lower extremity and is here today with his ankle wrapped in Ace wrap and using the clinic wheelchair  Pain is throughout his ankle and is aggravated with any range of motion movement of his ankle, light touch, weightbearing  Denies F/C/N/V    He has been using ice/heat therapy/Tylenol/Motrin all which only provided minimal relief  Prior Visit 9/28/2022 :  Initial evaluation of right ankle pain:  Patient reports this has been an ongoing issue for 10+ years from a football injury  He states he also has a history of "weak ankles" in general  He states that he had a workup from a prior provider in which an MRI was obtained  He states that he was offered a surgical intervention for a “intra-articular loose body” but that patient ended up deferring this option  He states ever since this workup, he has been having flare-ups of his right ankle pain and has FMLA paperwork allowing him restricted hours for these flare-ups of pain of his ankle  He states when he has pain of his ankle it is over the medial lateral aspects he describes as a sharp/aching pain of moderate to severe intensity in which he has difficulty weight-bearing on his right lower extremity  He states his ankles can swell up  He denies any discoloration  Reports intermittent tingling sensation of his ankle  He states with interventions such as NSAIDs, elevation, icing his pain does eventually resolved  Thus, upon chart review, he has had FMLA paperwork restricting him for 8 hours a day for 3 times a week to be excused from work as needed for ankle pain flares  He is asking for an updated FMLA form as it is expiring  He states that he did see physical therapy for this issue, but this was several years ago  Of note, I cannot confirm the extent of his injury, treatment plan, imaging studies (other than an ankle X-ray from 2016) of his right ankle based on chart review today  Initial evaluation of right knee pain:  This is a separate issue from his right ankle pain  He states this has been ongoing for the past couple days  No precipitating injury  He states he works in the [employer] in which he sits for a prolonged period of time    He states while sitting he noticed a progressive knee aching pain over the inferolateral aspect of his patella  He states that his knee was getting “locking up ”  Last reports initial swelling but this has resolved  He states the symptoms have been improving but not completely resolved  He states the pain can be aggravated from prolonged sitting or range of motion movements  He reports intermittent crepitus of his knees  Denies prior injuries or surgeries of his right knee in the past     Medical, Surgical, Family, and Social History    History reviewed  No pertinent past medical history  History reviewed  No pertinent surgical history  Social History   Social History     Substance and Sexual Activity   Alcohol Use Yes    Comment: rare     Social History     Substance and Sexual Activity   Drug Use Never     Social History     Tobacco Use   Smoking Status Never   Smokeless Tobacco Never     Family History   Problem Relation Age of Onset   • Hypertension Mother    • Diabetes Father    • Diabetes type II Father      No Known Allergies       Physical Exam  BP (!) 165/108   Pulse (!) 108   Ht 5' 6" (1 676 m)   Wt 112 kg (248 lb)   BMI 40 03 kg/m²     Constitutional:  see vital signs  Gen: obese, normocephalic/atraumatic, well-groomed  Pulmonary/Chest: Effort normal  No respiratory distress  Ortho Exam     Ankle Examination (focused):     Gait: Unable to tolerate weightbearing on his right lower extremity and is using clinic wheelchair  Ankle is also wrapped in Ace wrap which was removed for examination      RIGHT   Inspection Erythema +across ankle (as picture below)    Edema 2+    Ecchymosis none        ROM:  Plantarflexion 5    Dorsiflexion 5        Strength Pronation Unable to test due to pain    Supination Unable to test due to pain    Foot plantarflexion Unable to test due to pain    Foot dorsflexion Unable to test due to pain        TTP   diffusely over the medial/lateral/anterior aspects of the ankle    Hyperalgesia to light palpation       No calf tenderness    LE NV Exam: +2 DP/PT pulses Bilateral ankles pictured below for comparison              Medium joint arthrocentesis: R ankle  Universal Protocol:  Consent: Verbal consent obtained  Risks and benefits: risks, benefits and alternatives were discussed  Consent given by: patient  Patient understanding: patient states understanding of the procedure being performed  Site marked: the operative site was marked  Radiology Images displayed and confirmed   If images not available, report reviewed: imaging studies available  Required items: required blood products, implants, devices, and special equipment available  Patient identity confirmed: verbally with patient    Supporting Documentation  Indications: pain, joint swelling and diagnostic evaluation   Procedure Details  Location: ankle - R ankle  Preparation: Patient was prepped and draped in the usual sterile fashion  Needle size: 18 G  Ultrasound guidance: no  Approach: anteromedial    Aspirate amount: 5 mL  Aspirate: cloudy    Patient tolerance: patient tolerated the procedure well with no immediate complications  Dressing:  Sterile dressing applied    2 cc of bupivacaine 0 25% anesthetic injected into ankle prior to aspiration procedure

## 2023-01-18 LAB
APPEARANCE FLD: ABNORMAL
COLOR FLD: ABNORMAL
LYMPHOCYTES # SNV MANUAL: 15 %
MONOCYTES NFR SNV MANUAL: 3 %
NEUTROPHILS NFR SNV MANUAL: 79 %
NEUTS BAND NFR SNV: 3 %
SITE: ABNORMAL
TOTAL CELLS COUNTED SPEC: 100
WBC # FLD MANUAL: ABNORMAL /UL (ref 0–200)

## 2023-01-21 LAB
BACTERIA SPEC BFLD CULT: NO GROWTH
GRAM STN SPEC: NORMAL
GRAM STN SPEC: NORMAL

## 2023-01-22 LAB — B BURGDOR DNA SPEC QL NAA+PROBE: NEGATIVE

## 2023-01-24 ENCOUNTER — OFFICE VISIT (OUTPATIENT)
Dept: OBGYN CLINIC | Facility: MEDICAL CENTER | Age: 38
End: 2023-01-24

## 2023-01-24 VITALS
HEART RATE: 94 BPM | DIASTOLIC BLOOD PRESSURE: 103 MMHG | BODY MASS INDEX: 39.86 KG/M2 | WEIGHT: 248 LBS | HEIGHT: 66 IN | SYSTOLIC BLOOD PRESSURE: 154 MMHG

## 2023-01-24 DIAGNOSIS — M10.471 OTHER SECONDARY ACUTE GOUT OF RIGHT ANKLE: Primary | ICD-10-CM

## 2023-01-24 DIAGNOSIS — M25.471 RIGHT ANKLE EFFUSION: ICD-10-CM

## 2023-01-24 DIAGNOSIS — M25.571 CHRONIC PAIN OF RIGHT ANKLE: ICD-10-CM

## 2023-01-24 DIAGNOSIS — G89.29 CHRONIC PAIN OF RIGHT ANKLE: ICD-10-CM

## 2023-01-24 PROBLEM — M10.9 GOUT: Status: ACTIVE | Noted: 2023-01-24

## 2023-01-24 RX ORDER — METHYLPREDNISOLONE 4 MG/1
TABLET ORAL
Qty: 1 EACH | Refills: 0 | Status: SHIPPED | OUTPATIENT
Start: 2023-01-24

## 2023-01-24 RX ORDER — COLCHICINE 0.6 MG/1
0.6 TABLET ORAL DAILY
Qty: 30 TABLET | Refills: 1 | Status: SHIPPED | OUTPATIENT
Start: 2023-01-24

## 2023-01-24 NOTE — LETTER
January 24, 2023     Patient: Clinton Anderson  YOB: 1985  Date of Visit: 1/24/2023      To Whom it May Concern:    Clinton Anderson is under my professional care  Matty Mays was seen in my office on 1/24/2023  Patient can return to work without restrictions tomorrow on 1/25/2023  Follow up as needed  If you have any questions or concerns, please don't hesitate to call           Sincerely,          Kathy Johnson MD        CC: No Recipients

## 2023-02-15 ENCOUNTER — OFFICE VISIT (OUTPATIENT)
Dept: OBGYN CLINIC | Facility: MEDICAL CENTER | Age: 38
End: 2023-02-15

## 2023-02-15 VITALS
BODY MASS INDEX: 39.37 KG/M2 | DIASTOLIC BLOOD PRESSURE: 96 MMHG | SYSTOLIC BLOOD PRESSURE: 141 MMHG | WEIGHT: 245 LBS | HEIGHT: 66 IN | HEART RATE: 79 BPM

## 2023-02-15 DIAGNOSIS — M25.571 CHRONIC PAIN OF RIGHT ANKLE: Primary | ICD-10-CM

## 2023-02-15 DIAGNOSIS — M10.471 OTHER SECONDARY ACUTE GOUT OF RIGHT ANKLE: ICD-10-CM

## 2023-02-15 DIAGNOSIS — G89.29 CHRONIC PAIN OF RIGHT ANKLE: Primary | ICD-10-CM

## 2023-02-15 DIAGNOSIS — R21 RASH AND NONSPECIFIC SKIN ERUPTION: ICD-10-CM

## 2023-02-15 NOTE — PROGRESS NOTES
1  Chronic pain of right ankle        2  Other secondary acute gout of right ankle        3  Rash and nonspecific skin eruption  Ambulatory Referral to Dermatology    Right calf        Orders Placed This Encounter   Procedures   • Ambulatory Referral to Dermatology        Imaging Studies (I personally reviewed images in PACS and report):    • X-ray right knee 09/28/2022:  No acute osseous abnormalities  No significant degenerative changes  No joint effusion  • X-ray right ankle 09/20/2022:  No acute osseous abnormalities  Mortise intact  Calcaneal in enthesophye at the insertion of Achilles tendon  IMPRESSION:  · Chronic right ankle pain  · No precipitating injury  · Ongoing issue 10+ years  · Aspiration of right ankle from prior visit confirming gout arthritis  · Further improvement of symptoms since last visit after completion of Medrol Dosepak  Recently started colchicine  · Nonspecific rash of right lower extremity that reportedly has been receding over the past 1 to 2 weeks prior to starting colchicine  Possibly secondary to the edema of his right lower extremity from his gout flare  Imaging as per below  Other factors:  • BMI 40    PLAN:    • Clinical exam and radiographic imaging reviewed with patient today, with impression as per above  • Reassured patient of his progressive improvement at this time  • Counseled to continue colchicine daily  Reminded patient that I will do an order baseline blood work which was ordered on prior visit and I would want him to follow-up with his PCP after he obtains blood work as there could be consideration to either continue colchicine preventatively or transition to allopurinol  • Patient to continue with dietary modifications in order for gout  flare prevention as well  • In regards to the rash of his right lower extremity, I referred to dermatology for further evaluation if it does not continue to proceed      Return if symptoms worsen or fail to improve  Portions of the record may have been created with voice recognition software  Occasional wrong word or "sound a like" substitutions may have occurred due to the inherent limitations of voice recognition software  Read the chart carefully and recognize, using context, where substitutions have occurred  CHIEF COMPLAINT:  Chief Complaint   Patient presents with   • Right Ankle - Follow-up   • Right Foot - Follow-up         HPI:  Magan Blair is a 41 y o  male  who presents for     Visit 2/15/2023: Follow-up right ankle pain/swelling secondary to gout flare  Patient reports symptoms significantly improving since last visit after completion of Medrol Dosepak  He states he just started taking colchicine this past Monday as instructed  He states improvements include being able to tolerate weightbearing on his right lower extremity  He states when he walks on uneven terrain however he can aggravate his general ankle pain  He reports general stiffness of his ankle with range of motion movements  He states there is still some swelling of his ankle but less than before  He states he has not sensitive to palpation as he was previously  He states there is a patchy rash along the right lower extremity of his calf that he states was previously pruritic but is slowly being less pruritic and receding  He is unsure if this is a reaction to the medication or secondary to his gout  He denies fever/chills  He states he will intermittently have sweating upon awakening  Denies nausea/vomiting, diarrhea    Of note, patient states that he has been aggressively adjusting his diet since her last visit  He states he is progressively losing weight over time  Medical, Surgical, Family, and Social History    History reviewed  No pertinent past medical history  History reviewed  No pertinent surgical history    Social History   Social History     Substance and Sexual Activity   Alcohol Use Yes    Comment: rare Social History     Substance and Sexual Activity   Drug Use Never     Social History     Tobacco Use   Smoking Status Never   Smokeless Tobacco Never     Family History   Problem Relation Age of Onset   • Hypertension Mother    • Diabetes Father    • Diabetes type II Father      No Known Allergies       Physical Exam  /96   Pulse 79   Ht 5' 6" (1 676 m)   Wt 111 kg (245 lb)   BMI 39 54 kg/m²     Constitutional:  see vital signs  Gen: obese, normocephalic/atraumatic, well-groomed  Pulmonary/Chest: Effort normal  No respiratory distress  Ortho Exam     Ankle Examination (focused):     Gait: Normal without antalgia      RIGHT   Inspection Erythema + darkened appearance of ankle but less erythematous    Edema 1+    Ecchymosis none        ROM:  Plantarflexion 20    Dorsiflexion 10        Strength Pronation 5/5    Supination 5/5    Foot plantarflexion 5/5    Foot dorsflexion 5/5        TTP   denies       No calf tenderness    LE NV Exam: +2 DP/PT pulses     On inspection of his right lower extremity  He has a patchy appearing erythematous rash diffusely over the right lower extremity  He states he was much more diffuse few days ago and is slowly receding    He states there is minimal pruritus          Procedures

## 2023-03-12 PROBLEM — J06.9 VIRAL UPPER RESPIRATORY TRACT INFECTION: Status: RESOLVED | Noted: 2023-01-11 | Resolved: 2023-03-12

## 2023-04-11 DIAGNOSIS — G89.29 CHRONIC PAIN OF RIGHT ANKLE: ICD-10-CM

## 2023-04-11 DIAGNOSIS — M25.571 CHRONIC PAIN OF RIGHT ANKLE: ICD-10-CM

## 2023-04-11 DIAGNOSIS — M10.471 OTHER SECONDARY ACUTE GOUT OF RIGHT ANKLE: ICD-10-CM

## 2023-04-11 DIAGNOSIS — M25.471 RIGHT ANKLE EFFUSION: ICD-10-CM

## 2023-04-11 RX ORDER — COLCHICINE 0.6 MG/1
0.6 TABLET ORAL 2 TIMES DAILY
Qty: 60 TABLET | Refills: 1 | Status: SHIPPED | OUTPATIENT
Start: 2023-04-11 | End: 2023-07-08

## 2023-05-22 ENCOUNTER — OFFICE VISIT (OUTPATIENT)
Dept: FAMILY MEDICINE CLINIC | Facility: CLINIC | Age: 38
End: 2023-05-22

## 2023-05-22 VITALS
HEIGHT: 66 IN | DIASTOLIC BLOOD PRESSURE: 78 MMHG | TEMPERATURE: 97.6 F | OXYGEN SATURATION: 97 % | HEART RATE: 80 BPM | BODY MASS INDEX: 37.28 KG/M2 | WEIGHT: 232 LBS | SYSTOLIC BLOOD PRESSURE: 136 MMHG

## 2023-05-22 DIAGNOSIS — Z00.00 ANNUAL PHYSICAL EXAM: Primary | ICD-10-CM

## 2023-05-22 NOTE — PROGRESS NOTES
[address] [place] [address]    NAME: Sally Salamanca  AGE: 41 y o  SEX: male  : 1985     DATE: 2023     Assessment and Plan:     Problem List Items Addressed This Visit    None  Visit Diagnoses     Annual physical exam    -  Primary    Relevant Orders    CBC and differential    Comprehensive metabolic panel    Lipid panel          Immunizations and preventive care screenings were discussed with patient today  Appropriate education was printed on patient's after visit summary  Counseling:  · Exercise: the importance of regular exercise/physical activity was discussed  Recommend exercise 3-5 times per week for at least 30 minutes  BMI Counseling: Body mass index is 37 45 kg/m²  The BMI is above normal  Nutrition recommendations include decreasing portion sizes, encouraging healthy choices of fruits and vegetables, decreasing fast food intake, consuming healthier snacks, limiting drinks that contain sugar and moderation in carbohydrate intake  Exercise recommendations include moderate physical activity 150 minutes/week, exercising 3-5 times per week and strength training exercises  No pharmacotherapy was ordered  Patient referred to PCP  Rationale for BMI follow-up plan is due to patient being overweight or obese  Watches diet bc of gout  Depression Screening and Follow-up Plan: Patient was screened for depression during today's encounter  They screened negative with a PHQ-2 score of 0  Return in about 6 months (around 2023)  Chief Complaint:     Chief Complaint   Patient presents with   • Annual Exam      History of Present Illness:     Adult Annual Physical   Patient here for a comprehensive physical exam  The patient reports no problems  Diet and Physical Activity  · Diet/Nutrition: well balanced diet and consuming 3-5 servings of fruits/vegetables daily     · Exercise: walking, moderate cardiovascular exercise, 3-4 times a week on average and 30-60 minutes on average  Depression Screening  PHQ-2/9 Depression Screening    Little interest or pleasure in doing things: 0 - not at all  Feeling down, depressed, or hopeless: 0 - not at all  PHQ-2 Score: 0  PHQ-2 Interpretation: Negative depression screen       General Health  · Sleep: gets 4-6 hours of sleep on average and shift work sleep disorder  · Hearing: normal - bilateral   · Vision: no vision problems  · Dental: no dental visits for >1 year, brushes teeth twice daily and flosses teeth occasionally   Health  · History of STDs?: no      Review of Systems:     Review of Systems   Constitutional: Negative for fatigue and fever  HENT: Negative for congestion  Eyes: Negative for visual disturbance  Respiratory: Negative for chest tightness and shortness of breath  Cardiovascular: Negative for chest pain and palpitations  Gastrointestinal: Negative for abdominal pain  Genitourinary: Negative for difficulty urinating  Musculoskeletal: Positive for arthralgias (gouty attack over the winter)  Neurological: Negative for headaches  Hematological: Does not bruise/bleed easily  Past Medical History:     History reviewed  No pertinent past medical history  Past Surgical History:     History reviewed  No pertinent surgical history     Social History:     Social History     Socioeconomic History   • Marital status: Single     Spouse name: None   • Number of children: None   • Years of education: None   • Highest education level: None   Occupational History   • None   Tobacco Use   • Smoking status: Never   • Smokeless tobacco: Never   Vaping Use   • Vaping Use: Never used   Substance and Sexual Activity   • Alcohol use: Yes     Comment: rare   • Drug use: Never   • Sexual activity: None   Other Topics Concern   • None   Social History Narrative   • None     Social Determinants of Health     Financial Resource Strain: Not on file   Food Insecurity: "Not on file   Transportation Needs: Not on file   Physical Activity: Not on file   Stress: Not on file   Social Connections: Not on file   Intimate Partner Violence: Not on file   Housing Stability: Not on file      Family History:     Family History   Problem Relation Age of Onset   • Hypertension Mother    • Diabetes Father    • Diabetes type II Father       Current Medications:     Current Outpatient Medications   Medication Sig Dispense Refill   • colchicine (COLCRYS) 0 6 mg tablet Take 1 tablet (0 6 mg total) by mouth 2 (two) times a day 60 tablet 1   • fexofenadine (ALLEGRA) 180 MG tablet Take 180 mg by mouth daily     • Multiple Vitamin (MULTIVITAMIN) tablet Take 1 tablet by mouth daily       No current facility-administered medications for this visit  Allergies:     No Known Allergies   Physical Exam:     /78 (BP Location: Right arm, Patient Position: Sitting, Cuff Size: Large)   Pulse 80   Temp 97 6 °F (36 4 °C)   Ht 5' 6\" (1 676 m)   Wt 105 kg (232 lb)   SpO2 97%   BMI 37 45 kg/m²     Physical Exam  Vitals reviewed  Constitutional:       Appearance: Normal appearance  He is normal weight  HENT:      Right Ear: Tympanic membrane, ear canal and external ear normal       Left Ear: Tympanic membrane, ear canal and external ear normal       Nose: Nose normal       Mouth/Throat:      Mouth: Mucous membranes are moist    Eyes:      Extraocular Movements: Extraocular movements intact  Conjunctiva/sclera: Conjunctivae normal       Pupils: Pupils are equal, round, and reactive to light  Cardiovascular:      Rate and Rhythm: Normal rate and regular rhythm  Pulmonary:      Effort: Pulmonary effort is normal       Breath sounds: Normal breath sounds  No wheezing or rhonchi  Abdominal:      General: Abdomen is flat  There is no distension  Palpations: Abdomen is soft  Tenderness: There is no abdominal tenderness  Musculoskeletal:         General: Normal range of motion        " Cervical back: Normal range of motion  Right lower leg: No edema  Left lower leg: No edema  Lymphadenopathy:      Cervical: No cervical adenopathy  Skin:     General: Skin is warm  Capillary Refill: Capillary refill takes less than 2 seconds  Findings: No rash  Neurological:      General: No focal deficit present  Mental Status: He is alert and oriented to person, place, and time  Coordination: Coordination normal       Deep Tendon Reflexes: Reflexes normal    Psychiatric:         Mood and Affect: Mood normal          Behavior: Behavior normal          Thought Content:  Thought content normal          Judgment: Judgment normal           Diamond Alanis MD   [facility]

## 2023-06-02 LAB
ALBUMIN SERPL-MCNC: 4.8 G/DL (ref 3.6–5.1)
ALBUMIN/GLOB SERPL: 1.9 (CALC) (ref 1–2.5)
ALP SERPL-CCNC: 68 U/L (ref 36–130)
ALT SERPL-CCNC: 20 U/L (ref 9–46)
AST SERPL-CCNC: 18 U/L (ref 10–40)
BASOPHILS # BLD AUTO: 50 CELLS/UL (ref 0–200)
BASOPHILS NFR BLD AUTO: 0.7 %
BILIRUB SERPL-MCNC: 0.3 MG/DL (ref 0.2–1.2)
BUN SERPL-MCNC: 31 MG/DL (ref 7–25)
BUN/CREAT SERPL: 29 (CALC) (ref 6–22)
CALCIUM SERPL-MCNC: 9.7 MG/DL (ref 8.6–10.3)
CHLORIDE SERPL-SCNC: 105 MMOL/L (ref 98–110)
CHOLEST SERPL-MCNC: 173 MG/DL
CHOLEST/HDLC SERPL: 3.5 (CALC)
CO2 SERPL-SCNC: 26 MMOL/L (ref 20–32)
CREAT SERPL-MCNC: 1.06 MG/DL (ref 0.6–1.26)
EOSINOPHIL # BLD AUTO: 324 CELLS/UL (ref 15–500)
EOSINOPHIL NFR BLD AUTO: 4.5 %
ERYTHROCYTE [DISTWIDTH] IN BLOOD BY AUTOMATED COUNT: 12.8 % (ref 11–15)
GFR/BSA.PRED SERPLBLD CYS-BASED-ARV: 93 ML/MIN/1.73M2
GLOBULIN SER CALC-MCNC: 2.5 G/DL (CALC) (ref 1.9–3.7)
GLUCOSE SERPL-MCNC: 93 MG/DL (ref 65–99)
HCT VFR BLD AUTO: 48.1 % (ref 38.5–50)
HDLC SERPL-MCNC: 50 MG/DL
HGB BLD-MCNC: 16.2 G/DL (ref 13.2–17.1)
LDLC SERPL CALC-MCNC: 100 MG/DL (CALC)
LYMPHOCYTES # BLD AUTO: 2858 CELLS/UL (ref 850–3900)
LYMPHOCYTES NFR BLD AUTO: 39.7 %
MCH RBC QN AUTO: 28.9 PG (ref 27–33)
MCHC RBC AUTO-ENTMCNC: 33.7 G/DL (ref 32–36)
MCV RBC AUTO: 85.9 FL (ref 80–100)
MONOCYTES # BLD AUTO: 439 CELLS/UL (ref 200–950)
MONOCYTES NFR BLD AUTO: 6.1 %
NEUTROPHILS # BLD AUTO: 3528 CELLS/UL (ref 1500–7800)
NEUTROPHILS NFR BLD AUTO: 49 %
NONHDLC SERPL-MCNC: 123 MG/DL (CALC)
PLATELET # BLD AUTO: 261 THOUSAND/UL (ref 140–400)
PMV BLD REES-ECKER: 10.3 FL (ref 7.5–12.5)
POTASSIUM SERPL-SCNC: 4.6 MMOL/L (ref 3.5–5.3)
PROT SERPL-MCNC: 7.3 G/DL (ref 6.1–8.1)
RBC # BLD AUTO: 5.6 MILLION/UL (ref 4.2–5.8)
SODIUM SERPL-SCNC: 140 MMOL/L (ref 135–146)
TRIGL SERPL-MCNC: 133 MG/DL
WBC # BLD AUTO: 7.2 THOUSAND/UL (ref 3.8–10.8)

## 2023-06-10 DIAGNOSIS — G89.29 CHRONIC PAIN OF RIGHT ANKLE: ICD-10-CM

## 2023-06-10 DIAGNOSIS — M25.471 RIGHT ANKLE EFFUSION: ICD-10-CM

## 2023-06-10 DIAGNOSIS — M10.471 OTHER SECONDARY ACUTE GOUT OF RIGHT ANKLE: ICD-10-CM

## 2023-06-10 DIAGNOSIS — M25.571 CHRONIC PAIN OF RIGHT ANKLE: ICD-10-CM

## 2023-07-08 RX ORDER — COLCHICINE 0.6 MG/1
TABLET ORAL
Qty: 60 TABLET | Refills: 1 | Status: SHIPPED | OUTPATIENT
Start: 2023-07-08

## 2023-07-17 PROCEDURE — 90471 IMMUNIZATION ADMIN: CPT

## 2023-07-17 PROCEDURE — 99282 EMERGENCY DEPT VISIT SF MDM: CPT

## 2023-07-18 ENCOUNTER — HOSPITAL ENCOUNTER (EMERGENCY)
Facility: HOSPITAL | Age: 38
Discharge: HOME | End: 2023-07-18
Attending: EMERGENCY MEDICINE | Admitting: EMERGENCY MEDICINE
Payer: COMMERCIAL

## 2023-07-18 VITALS
HEART RATE: 96 BPM | SYSTOLIC BLOOD PRESSURE: 140 MMHG | TEMPERATURE: 98.7 F | DIASTOLIC BLOOD PRESSURE: 93 MMHG | OXYGEN SATURATION: 97 % | RESPIRATION RATE: 18 BRPM

## 2023-07-18 DIAGNOSIS — S61.512A LACERATION OF LEFT WRIST WITHOUT COMPLICATION, INITIAL ENCOUNTER: Primary | ICD-10-CM

## 2023-07-18 PROCEDURE — 90715 TDAP VACCINE 7 YRS/> IM: CPT | Performed by: PHYSICIAN ASSISTANT

## 2023-07-18 RX ORDER — GINSENG 100 MG
1 CAPSULE ORAL ONCE
Status: COMPLETED | OUTPATIENT
Start: 2023-07-18 | End: 2023-07-18

## 2023-07-18 RX ORDER — LIDOCAINE HYDROCHLORIDE AND EPINEPHRINE 20; 5 MG/ML; UG/ML
10 INJECTION, SOLUTION EPIDURAL; INFILTRATION; INTRACAUDAL; PERINEURAL ONCE
Status: COMPLETED | OUTPATIENT
Start: 2023-07-18 | End: 2023-07-18

## 2023-07-18 RX ORDER — LORATADINE 10 MG/1
10 TABLET ORAL DAILY
COMMUNITY

## 2023-07-18 RX ADMIN — LIDOCAINE HYDROCHLORIDE,EPINEPHRINE BITARTRATE 10 ML: 20; .005 INJECTION, SOLUTION EPIDURAL; INFILTRATION; INTRACAUDAL; PERINEURAL at 01:24

## 2023-07-18 RX ADMIN — BACITRACIN 1 SMALL APPLICATION: 500 OINTMENT TOPICAL at 02:10

## 2023-07-18 RX ADMIN — TETANUS TOXOID, REDUCED DIPHTHERIA TOXOID AND ACELLULAR PERTUSSIS VACCINE, ADSORBED 0.5 ML: 5; 2.5; 8; 8; 2.5 SUSPENSION INTRAMUSCULAR at 01:24

## 2023-07-18 NOTE — Clinical Note
Jesus Jennings was seen and treated in our emergency department on 7/17/2023. Diagnosis:     Dia Ricketts  may return to work on return date. He may return on this date: 07/19/2023         If you have any questions or concerns, please don't hesitate to call.       Billie Lacy RN    ______________________________           _______________          _______________  Hospital Representative                              Date                                Time

## 2023-07-18 NOTE — ED PROVIDER NOTES
History  Chief Complaint   Patient presents with   • Extremity Laceration     L forearm lac from . Approx 2 inch lac, minimal bleeding. Patient is a 12year-old male with no significant past medical or surgical history that presents to the emerged department with laceration to left wrist for 2 hours. Patient denies associated symptoms. Patient states that he was at home, using a  to open a box when he accidentally cut his left wrist with the  aforementioned. Bleeding controlled at this time with gauze dressing. Patient is currently right-handed. Patient denies AC/AP. Patient denies palliative factors with provocative factors of pressure to left wrist.  Patient denies noneffective treatment. Patient is unaware of current Tdap status. Patient denies fevers, chills, nausea, vomiting, diarrhea, constipation and urinary symptoms. Patient has recent fall recent trauma. Patient denies sick contacts recent travel. Patient denies numbness, tingling, loss of power of extremities. Patient denies chest pain, shortness of breath, and abdominal pain. History provided by:  Patient   used: No        Prior to Admission Medications   Prescriptions Last Dose Informant Patient Reported? Taking? Multiple Vitamin (MULTIVITAMIN) tablet 7/18/2023 Self Yes Yes   Sig: Take 1 tablet by mouth daily   colchicine (COLCRYS) 0.6 mg tablet 7/18/2023  No Yes   Sig: TAKE 1 TABLET BY MOUTH 2 TIMES A DAY. fexofenadine (ALLEGRA) 180 MG tablet Not Taking Self Yes No   Sig: Take 180 mg by mouth daily   Patient not taking: Reported on 7/18/2023   loratadine (CLARITIN) 10 mg tablet 7/18/2023  Yes Yes   Sig: Take 10 mg by mouth daily      Facility-Administered Medications: None       Past Medical History:   Diagnosis Date   • Gout        History reviewed. No pertinent surgical history.     Family History   Problem Relation Age of Onset   • Hypertension Mother    • Diabetes Father    • Diabetes type II Father      I have reviewed and agree with the history as documented. E-Cigarette/Vaping   • E-Cigarette Use Never User      E-Cigarette/Vaping Substances     Social History     Tobacco Use   • Smoking status: Never   • Smokeless tobacco: Never   Vaping Use   • Vaping Use: Never used   Substance Use Topics   • Alcohol use: Yes     Comment: rare   • Drug use: Never       Review of Systems   Constitutional: Negative for activity change, appetite change, chills and fever. HENT: Negative for congestion, ear pain, postnasal drip, rhinorrhea, sinus pressure, sinus pain, sore throat and tinnitus. Eyes: Negative for photophobia, pain and visual disturbance. Respiratory: Negative for cough, chest tightness and shortness of breath. Cardiovascular: Negative for chest pain and palpitations. Gastrointestinal: Negative for abdominal pain, constipation, diarrhea, nausea and vomiting. Genitourinary: Negative for difficulty urinating, dysuria, flank pain, frequency, hematuria and urgency. Musculoskeletal: Negative for arthralgias, back pain, gait problem, neck pain and neck stiffness. Skin: Positive for wound. Negative for color change, pallor and rash. Allergic/Immunologic: Negative for environmental allergies and food allergies. Neurological: Negative for dizziness, seizures, syncope, weakness, numbness and headaches. Psychiatric/Behavioral: Negative for confusion. All other systems reviewed and are negative. Physical Exam  Physical Exam  Vitals and nursing note reviewed. Constitutional:       Appearance: Normal appearance. He is normal weight. Comments: /93 (BP Location: Right arm)   Pulse 96   Temp 98.7 °F (37.1 °C) (Oral)   Resp 18   SpO2 97%      HENT:      Head: Normocephalic and atraumatic. Jaw: There is normal jaw occlusion.       Right Ear: Hearing, tympanic membrane and external ear normal.      Left Ear: Hearing, tympanic membrane and external ear normal.      Nose: Nose normal.      Mouth/Throat:      Lips: Pink. Mouth: Mucous membranes are moist.      Pharynx: Oropharynx is clear. Eyes:      General: Lids are normal. Vision grossly intact. Gaze aligned appropriately. Extraocular Movements: Extraocular movements intact. Conjunctiva/sclera: Conjunctivae normal.      Pupils: Pupils are equal, round, and reactive to light. Neck:      Vascular: No JVD. Trachea: Trachea and phonation normal.   Cardiovascular:      Rate and Rhythm: Normal rate and regular rhythm. Pulses: Normal pulses. Radial pulses are 2+ on the right side and 2+ on the left side. Heart sounds: Normal heart sounds. Pulmonary:      Effort: Pulmonary effort is normal.      Breath sounds: Normal breath sounds and air entry. Musculoskeletal:         General: Normal range of motion. Left wrist: Laceration present. Hands:       Cervical back: Full passive range of motion without pain, normal range of motion and neck supple. Comments: Passive ROM intact  Upper and lower extremity 5/5 bilaterally  Neurovascularly intact  No grinding or clicking of joints     Feet:      Right foot:      Toenail Condition: Right toenails are normal.      Left foot:      Toenail Condition: Left toenails are normal.   Lymphadenopathy:      Head:      Right side of head: No submental, submandibular, tonsillar, preauricular, posterior auricular or occipital adenopathy. Left side of head: No submental, submandibular, tonsillar, preauricular, posterior auricular or occipital adenopathy. Cervical: No cervical adenopathy. Right cervical: No superficial, deep or posterior cervical adenopathy. Left cervical: No superficial, deep or posterior cervical adenopathy. Skin:     General: Skin is warm. Capillary Refill: Capillary refill takes less than 2 seconds. Findings: No rash. Neurological:      General: No focal deficit present. Mental Status: He is alert and oriented to person, place, and time. Mental status is at baseline. Psychiatric:         Attention and Perception: Attention normal.         Mood and Affect: Mood normal.         Speech: Speech normal.         Behavior: Behavior normal. Behavior is cooperative. Thought Content: Thought content normal.         Vital Signs  ED Triage Vitals [07/18/23 0010]   Temperature Pulse Respirations Blood Pressure SpO2   98.7 °F (37.1 °C) 96 18 140/93 97 %      Temp Source Heart Rate Source Patient Position - Orthostatic VS BP Location FiO2 (%)   Oral Monitor Sitting Right arm --      Pain Score       --           Vitals:    07/18/23 0010   BP: 140/93   Pulse: 96   Patient Position - Orthostatic VS: Sitting         Visual Acuity      ED Medications  Medications   lidocaine-epinephrine (XYLOCAINE-MPF/EPINEPHRINE) 2 %-1:200,000 injection 10 mL (10 mL Infiltration Given 7/18/23 0124)   tetanus-diphtheria-acellular pertussis (BOOSTRIX) IM injection 0.5 mL (0.5 mL Intramuscular Given 7/18/23 0124)   bacitracin topical ointment 1 small application (1 small application Topical Given 7/18/23 0210)       Diagnostic Studies  Results Reviewed     None                 No orders to display              Procedures  Universal Protocol:  Consent: Verbal consent obtained. Risks and benefits: risks, benefits and alternatives were discussed  Consent given by: patient  Time out: Immediately prior to procedure a "time out" was called to verify the correct patient, procedure, equipment, support staff and site/side marked as required.   Timeout called at: 7/18/2023 1:53 AM.  Patient understanding: patient states understanding of the procedure being performed  Patient consent: the patient's understanding of the procedure matches consent given  Patient identity confirmed: verbally with patient and arm band    Laceration repair    Date/Time: 7/18/2023 1:52 AM    Performed by: Pat Lizama PA-C  Authorized by: Darcy Ramos PA-C  Body area: upper extremity  Location details: left wrist  Laceration length: 4 cm  Foreign bodies: no foreign bodies  Tendon involvement: none  Nerve involvement: none  Vascular damage: no    Anesthesia:  Local Anesthetic: lidocaine 2% with epinephrine  Anesthetic total: 5 mL    Sedation:  Patient sedated: no        Procedure Details:  Preparation: Patient was prepped and draped in the usual sterile fashion. Irrigation solution: saline  Irrigation method: jet lavage  Amount of cleaning: extensive  Debridement: none  Degree of undermining: none  Skin closure: 5-0 nylon  Number of sutures: 5  Technique: simple  Approximation: close  Approximation difficulty: simple  Dressing: antibiotic ointment  Patient tolerance: patient tolerated the procedure well with no immediate complications  Cleaning details: other organic matter and other particulate matter             ED Course                                             Medical Decision Making  Patient is a 12year-old male with no significant past medical or surgical history that presents to the emerged department with laceration to left wrist for 2 hours. Patient denies associated symptoms. Patient states that he was at home, using a  to open a box when he accidentally cut his left wrist with the  aforementioned. Performed laceration repair with no complications; patient tolerated procedure well  Left radial and ulnar pulses intact and bounding, patient with symmetrical flexion and extension of bilateral wrists. Patient denies suicidal ideations and plan, patient denies homicidal ideations and plan.   Patient demonstrates verbal satisfaction of post laceration repair; 5 simple interrupted sutures, 5.0 Ethilon  Boostrixdelivered  ED RN had placed bacitracin and wound dressing to repaired laceration  Follow-up with PCP for suture removal in 12 days  Follow up with emergency department if symptoms persist or exacerbate  Patient demonstrates verbal understanding of all discharge instructions and follow-up    Risk  OTC drugs. Prescription drug management. Disposition  Final diagnoses:   Laceration of left wrist without complication, initial encounter     Time reflects when diagnosis was documented in both MDM as applicable and the Disposition within this note     Time User Action Codes Description Comment    7/18/2023  1:54 AM Kavita Taylor Add [C41.158C] Laceration of left wrist without complication, initial encounter       ED Disposition     ED Disposition   Discharge    Condition   Stable    Date/Time   Tue Jul 18, 2023  1:54 AM    Comment   Ronny Peak discharge to home/self care. Follow-up Information     Follow up With Specialties Details Why Contact Info Additional [facility] Emergency Department Emergency Medicine   [address] [facility] Emergency Department, [address], [address], [address] [place] [phone]             Discharge Medication List as of 7/18/2023  1:59 AM      CONTINUE these medications which have NOT CHANGED    Details   colchicine (COLCRYS) 0.6 mg tablet TAKE 1 TABLET BY MOUTH 2 TIMES A DAY., Normal      loratadine (CLARITIN) 10 mg tablet Take 10 mg by mouth daily, Historical Med      Multiple Vitamin (MULTIVITAMIN) tablet Take 1 tablet by mouth daily, Historical Med      fexofenadine (ALLEGRA) 180 MG tablet Take 180 mg by mouth daily, Historical Med             No discharge procedures on file.     PDMP Review     None          ED Provider  Electronically Signed by           Kavita Taylor PA-C  07/18/23 8612

## 2023-07-18 NOTE — Clinical Note
Ramirez Caceres was seen and treated in our emergency department on 7/17/2023. Diagnosis:     Jesus Alvarez  may return to work on return date. He may return on this date: 07/19/2023         If you have any questions or concerns, please don't hesitate to call.       Navneet Ashley RN    ______________________________           _______________          _______________  Hospital Representative                              Date                                Time

## 2023-07-18 NOTE — Clinical Note
Katia Lopez was seen and treated in our emergency department on 7/17/2023. Diagnosis:     Ulisses Leyva  . He may return on this date: 07/20/2023         If you have any questions or concerns, please don't hesitate to call.       Patito Baker PA-C    ______________________________           _______________          _______________  Hospital Representative                              Date                                Time

## 2023-07-18 NOTE — Clinical Note
Nimco Hurtado was seen and treated in our emergency department on 7/17/2023. Diagnosis:     Kwasi Lieberman  . He may return on this date: 07/20/2023         If you have any questions or concerns, please don't hesitate to call.       Gemma Parsons PA-C    ______________________________           _______________          _______________  Hospital Representative                              Date                                Time

## 2023-07-24 DIAGNOSIS — M25.571 CHRONIC PAIN OF RIGHT ANKLE: ICD-10-CM

## 2023-07-24 DIAGNOSIS — G89.29 CHRONIC PAIN OF RIGHT ANKLE: ICD-10-CM

## 2023-07-24 DIAGNOSIS — M10.471 OTHER SECONDARY ACUTE GOUT OF RIGHT ANKLE: ICD-10-CM

## 2023-07-24 DIAGNOSIS — M25.471 RIGHT ANKLE EFFUSION: ICD-10-CM

## 2023-07-24 RX ORDER — COLCHICINE 0.6 MG/1
0.6 TABLET ORAL 2 TIMES DAILY
Qty: 60 TABLET | Refills: 1 | Status: CANCELLED | OUTPATIENT
Start: 2023-07-24

## 2023-08-03 ENCOUNTER — OFFICE VISIT (OUTPATIENT)
Dept: FAMILY MEDICINE CLINIC | Facility: CLINIC | Age: 38
End: 2023-08-03
Payer: COMMERCIAL

## 2023-08-03 VITALS
BODY MASS INDEX: 38.57 KG/M2 | HEART RATE: 91 BPM | DIASTOLIC BLOOD PRESSURE: 88 MMHG | SYSTOLIC BLOOD PRESSURE: 136 MMHG | TEMPERATURE: 97.3 F | OXYGEN SATURATION: 100 % | WEIGHT: 240 LBS | HEIGHT: 66 IN

## 2023-08-03 DIAGNOSIS — M25.471 RIGHT ANKLE EFFUSION: ICD-10-CM

## 2023-08-03 DIAGNOSIS — M10.471 OTHER SECONDARY ACUTE GOUT OF RIGHT ANKLE: ICD-10-CM

## 2023-08-03 DIAGNOSIS — S61.512D LACERATION OF LEFT WRIST, SUBSEQUENT ENCOUNTER: Primary | ICD-10-CM

## 2023-08-03 DIAGNOSIS — M25.571 CHRONIC PAIN OF RIGHT ANKLE: ICD-10-CM

## 2023-08-03 DIAGNOSIS — Z48.02 VISIT FOR SUTURE REMOVAL: ICD-10-CM

## 2023-08-03 DIAGNOSIS — M10.072 ACUTE IDIOPATHIC GOUT OF LEFT ANKLE: ICD-10-CM

## 2023-08-03 DIAGNOSIS — G89.29 CHRONIC PAIN OF RIGHT ANKLE: ICD-10-CM

## 2023-08-03 PROCEDURE — 99214 OFFICE O/P EST MOD 30 MIN: CPT | Performed by: FAMILY MEDICINE

## 2023-08-03 RX ORDER — COLCHICINE 0.6 MG/1
0.6 TABLET ORAL 2 TIMES DAILY PRN
Qty: 60 TABLET | Refills: 0 | Status: SHIPPED | OUTPATIENT
Start: 2023-08-03

## 2023-08-03 RX ORDER — FEXOFENADINE HCL AND PSEUDOEPHEDRINE HCI 180; 240 MG/1; MG/1
1 TABLET, EXTENDED RELEASE ORAL DAILY
COMMUNITY

## 2023-08-03 RX ORDER — ALLOPURINOL 100 MG/1
100 TABLET ORAL DAILY
Qty: 90 TABLET | Refills: 0 | Status: SHIPPED | OUTPATIENT
Start: 2023-08-03

## 2023-08-03 NOTE — PROGRESS NOTES
Assessment/Plan:    1. Laceration of left wrist, subsequent encounter    2. Chronic pain of right ankle  -     colchicine (COLCRYS) 0.6 mg tablet; Take 1 tablet (0.6 mg total) by mouth 2 (two) times a day as needed for muscle/joint pain (gout flare)    3. Right ankle effusion  -     colchicine (COLCRYS) 0.6 mg tablet; Take 1 tablet (0.6 mg total) by mouth 2 (two) times a day as needed for muscle/joint pain (gout flare)    4. Other secondary acute gout of right ankle  -     colchicine (COLCRYS) 0.6 mg tablet; Take 1 tablet (0.6 mg total) by mouth 2 (two) times a day as needed for muscle/joint pain (gout flare)    5. Visit for suture removal    6. Acute idiopathic gout of left ankle  -     allopurinol (ZYLOPRIM) 100 mg tablet; Take 1 tablet (100 mg total) by mouth daily        Patient Instructions   Stop colchicine for maintenance and switch to allopurinol, use colchicine for flares  Consider black cherry extract for prevention as well, discussed dietary measures for prevention      No follow-ups on file. Subjective:      Patient ID: Salud Iyer is a 41 y.o. male. Chief Complaint   Patient presents with   • discuss medication   • Suture / Staple Removal       Has had multiple flares of pain and redness L ankle. Initially thought it was related to an old injury. Saw ortho in January 2023. Joint was tapped and officially diagnosed gout. Pt has been taking colchicine since then and has not had any flares. Ortho relocated, pt was encouraged to seek care with PCP. No known dietary triggers. Rare EtOH. Not much seafood. Maybe red meat intake. Suture / Staple Removal        The following portions of the patient's history were reviewed and updated as appropriate: allergies, current medications, past family history, past medical history, past social history, past surgical history and problem list.    Review of Systems   Constitutional: Negative for activity change, appetite change, fatigue and fever.    HENT: Negative for congestion. Eyes: Negative for visual disturbance. Respiratory: Negative for chest tightness and shortness of breath. Cardiovascular: Negative for chest pain and palpitations. Gastrointestinal: Negative for abdominal pain. Genitourinary: Negative for difficulty urinating. Musculoskeletal: Negative for arthralgias. Neurological: Negative for headaches. Hematological: Does not bruise/bleed easily. Current Outpatient Medications   Medication Sig Dispense Refill   • allopurinol (ZYLOPRIM) 100 mg tablet Take 1 tablet (100 mg total) by mouth daily 90 tablet 0   • colchicine (COLCRYS) 0.6 mg tablet Take 1 tablet (0.6 mg total) by mouth 2 (two) times a day as needed for muscle/joint pain (gout flare) 60 tablet 0   • fexofenadine-pseudoephedrine (ALLEGRA-D 24) 180-240 MG per 24 hr tablet Take 1 tablet by mouth daily     • Multiple Vitamin (MULTIVITAMIN) tablet Take 1 tablet by mouth daily     • fexofenadine (ALLEGRA) 180 MG tablet Take 180 mg by mouth daily (Patient not taking: Reported on 8/3/2023)     • loratadine (CLARITIN) 10 mg tablet Take 10 mg by mouth daily (Patient not taking: Reported on 8/3/2023)       No current facility-administered medications for this visit. Objective:    /88 (BP Location: Right arm, Patient Position: Sitting, Cuff Size: Large)   Pulse 91   Temp (!) 97.3 °F (36.3 °C) (Temporal)   Ht 5' 6" (1.676 m)   Wt 109 kg (240 lb)   SpO2 100%   BMI 38.74 kg/m²        Physical Exam  Vitals reviewed. Constitutional:       Appearance: Normal appearance. He is well-developed. Cardiovascular:      Rate and Rhythm: Normal rate. Pulmonary:      Effort: Pulmonary effort is normal.   Musculoskeletal:      Right lower leg: No edema. Left lower leg: No edema. Skin:     General: Skin is warm. Comments: 5 sutures removed without incident, L wrist   Neurological:      Mental Status: He is alert and oriented to person, place, and time. Psychiatric:         Mood and Affect: Mood normal.         Behavior: Behavior normal.         Thought Content: Thought content normal.         Judgment: Judgment normal.            Suture removal    Date/Time: 8/3/2023 8:15 AM    Performed by: Luisito Nair MD  Authorized by: Luisito Nair MD  Universal Protocol:  Consent: Verbal consent obtained. Consent given by: patient  Patient understanding: patient states understanding of the procedure being performed        Patient location:  Clinic  Location:     Laterality:  Left    Location:  Upper extremity    Upper extremity location:  Wrist  Procedure details: Tools used:  Suture removal kit    Number of sutures removed:  5  Post-procedure details:     Post-removal:  No dressing applied    Patient tolerance of procedure:   Tolerated well, no immediate complications          Luisito Nair MD

## 2023-08-03 NOTE — PATIENT INSTRUCTIONS
Stop colchicine for maintenance and switch to allopurinol, use colchicine for flares  Consider black cherry extract for prevention as well, discussed dietary measures for prevention

## 2023-08-14 ENCOUNTER — TELEPHONE (OUTPATIENT)
Dept: FAMILY MEDICINE CLINIC | Facility: CLINIC | Age: 38
End: 2023-08-14

## 2023-08-14 NOTE — TELEPHONE ENCOUNTER
Patient startedted on new medication last week, week and a half ago allopurinol for gout and today patient is suffering a flare up in left ankle. Patient is inquiring about what to do next?

## 2023-08-16 NOTE — TELEPHONE ENCOUNTER
Can pt just take colchicine 1 tab only, daily, and not take Allopurinol as colchicine works. Could the increase in Allopurinol cause kidney damage?

## 2023-08-29 DIAGNOSIS — M25.571 CHRONIC PAIN OF RIGHT ANKLE: ICD-10-CM

## 2023-08-29 DIAGNOSIS — M25.471 RIGHT ANKLE EFFUSION: ICD-10-CM

## 2023-08-29 DIAGNOSIS — M10.471 OTHER SECONDARY ACUTE GOUT OF RIGHT ANKLE: ICD-10-CM

## 2023-08-29 DIAGNOSIS — G89.29 CHRONIC PAIN OF RIGHT ANKLE: ICD-10-CM

## 2023-08-29 RX ORDER — COLCHICINE 0.6 MG/1
0.6 TABLET ORAL 2 TIMES DAILY PRN
Qty: 60 TABLET | Refills: 0 | OUTPATIENT
Start: 2023-08-29

## 2023-08-30 DIAGNOSIS — M25.471 RIGHT ANKLE EFFUSION: ICD-10-CM

## 2023-08-30 DIAGNOSIS — G89.29 CHRONIC PAIN OF RIGHT ANKLE: ICD-10-CM

## 2023-08-30 DIAGNOSIS — M25.571 CHRONIC PAIN OF RIGHT ANKLE: ICD-10-CM

## 2023-08-30 DIAGNOSIS — M10.471 OTHER SECONDARY ACUTE GOUT OF RIGHT ANKLE: ICD-10-CM

## 2023-08-30 RX ORDER — COLCHICINE 0.6 MG/1
0.6 TABLET ORAL 2 TIMES DAILY PRN
Qty: 60 TABLET | Refills: 0 | Status: SHIPPED | OUTPATIENT
Start: 2023-08-30

## 2023-09-12 ENCOUNTER — TELEPHONE (OUTPATIENT)
Age: 38
End: 2023-09-12

## 2023-09-12 NOTE — TELEPHONE ENCOUNTER
Patient called stating he is on FMLA and previously had his orthopedic doctor fill out his FMLA paperwork. However, his doctor had transferred out to a different practice and would like to know if Dr. Fatoumata Jackson could take over and fill out his FMLA form. Patient unsure if he would need to come in for an appointment or he could simply drop it off. Please call patient back to discuss.

## 2023-09-21 ENCOUNTER — OFFICE VISIT (OUTPATIENT)
Dept: FAMILY MEDICINE CLINIC | Facility: CLINIC | Age: 38
End: 2023-09-21
Payer: COMMERCIAL

## 2023-09-21 VITALS
HEIGHT: 66 IN | HEART RATE: 86 BPM | OXYGEN SATURATION: 99 % | DIASTOLIC BLOOD PRESSURE: 80 MMHG | WEIGHT: 248.4 LBS | SYSTOLIC BLOOD PRESSURE: 138 MMHG | BODY MASS INDEX: 39.92 KG/M2 | TEMPERATURE: 97.2 F

## 2023-09-21 DIAGNOSIS — M25.571 CHRONIC PAIN OF RIGHT ANKLE: Primary | ICD-10-CM

## 2023-09-21 DIAGNOSIS — G89.29 CHRONIC PAIN OF RIGHT ANKLE: Primary | ICD-10-CM

## 2023-09-21 DIAGNOSIS — M10.072 ACUTE IDIOPATHIC GOUT OF LEFT ANKLE: ICD-10-CM

## 2023-09-21 PROCEDURE — 99214 OFFICE O/P EST MOD 30 MIN: CPT | Performed by: FAMILY MEDICINE

## 2023-09-21 RX ORDER — METHYLPREDNISOLONE 4 MG/1
TABLET ORAL
Qty: 21 EACH | Refills: 0 | Status: SHIPPED | OUTPATIENT
Start: 2023-09-21

## 2023-09-21 NOTE — PROGRESS NOTES
Assessment/Plan:    1. Chronic pain of right ankle  -     methylPREDNISolone 4 MG tablet therapy pack; Use as directed on package    2. Acute idiopathic gout of left ankle  -     methylPREDNISolone 4 MG tablet therapy pack; Use as directed on package        Patient Instructions   Will complete paperwork when available  Keep medrol dose jenni on hand for future flare      Return in about 6 months (around 3/21/2024). Subjective:      Patient ID: Tian Cool is a 41 y.o. male. Chief Complaint   Patient presents with   • FMLA papers       Here for FMLA. Gets ankle flare ups for gout. Works in [employer], on feet for whole shift. When he has a flare has to keep the leg elevated. Condition started at least 7-8 years ago, previously had seen ortho, who recently left his office and new location is too far. This period starts 9/24/23. In the past episodes, episodes last about 3 days with the exception of Jan 23 and he was out for over a week. Now he would like to have 5 episodes with 3 days each. Has done well with colchicine for prevention as started by ortho. After a week on allopurinol, pt flared again. He was using medrol dose jenni for flares. The following portions of the patient's history were reviewed and updated as appropriate: allergies, current medications, past family history, past medical history, past social history, past surgical history and problem list.    Review of Systems   Constitutional: Negative for activity change, appetite change, fatigue and fever. HENT: Negative for congestion. Eyes: Negative for visual disturbance. Respiratory: Negative for chest tightness and shortness of breath. Cardiovascular: Negative for chest pain and palpitations. Gastrointestinal: Negative for abdominal pain. Genitourinary: Negative for difficulty urinating. Musculoskeletal: Positive for arthralgias and joint swelling. Neurological: Negative for headaches.    Hematological: Does not bruise/bleed easily. Current Outpatient Medications   Medication Sig Dispense Refill   • colchicine (COLCRYS) 0.6 mg tablet Take 1 tablet (0.6 mg total) by mouth 2 (two) times a day as needed for muscle/joint pain (gout flare) 60 tablet 0   • fexofenadine-pseudoephedrine (ALLEGRA-D 24) 180-240 MG per 24 hr tablet Take 1 tablet by mouth daily     • methylPREDNISolone 4 MG tablet therapy pack Use as directed on package 21 each 0   • Multiple Vitamin (MULTIVITAMIN) tablet Take 1 tablet by mouth daily     • fexofenadine (ALLEGRA) 180 MG tablet Take 180 mg by mouth daily (Patient not taking: Reported on 8/3/2023)     • loratadine (CLARITIN) 10 mg tablet Take 10 mg by mouth daily (Patient not taking: Reported on 8/3/2023)       No current facility-administered medications for this visit. Objective:    /80 (BP Location: Right arm, Patient Position: Sitting, Cuff Size: Large)   Pulse 86   Temp (!) 97.2 °F (36.2 °C) (Temporal)   Ht 5' 6" (1.676 m)   Wt 113 kg (248 lb 6.4 oz)   SpO2 99%   BMI 40.09 kg/m²        Physical Exam  Vitals reviewed. Constitutional:       Appearance: Normal appearance. Cardiovascular:      Rate and Rhythm: Normal rate. Pulmonary:      Effort: Pulmonary effort is normal.   Neurological:      Mental Status: He is alert. Psychiatric:         Mood and Affect: Mood normal.         Behavior: Behavior normal.         Thought Content:  Thought content normal.         Judgment: Judgment normal.                Olaf Medina MD

## 2023-10-07 DIAGNOSIS — M25.571 CHRONIC PAIN OF RIGHT ANKLE: ICD-10-CM

## 2023-10-07 DIAGNOSIS — M25.471 RIGHT ANKLE EFFUSION: ICD-10-CM

## 2023-10-07 DIAGNOSIS — M10.471 OTHER SECONDARY ACUTE GOUT OF RIGHT ANKLE: ICD-10-CM

## 2023-10-07 DIAGNOSIS — G89.29 CHRONIC PAIN OF RIGHT ANKLE: ICD-10-CM

## 2023-10-09 RX ORDER — COLCHICINE 0.6 MG/1
TABLET ORAL
Qty: 60 TABLET | Refills: 0 | Status: SHIPPED | OUTPATIENT
Start: 2023-10-09

## 2023-11-04 DIAGNOSIS — M25.471 RIGHT ANKLE EFFUSION: ICD-10-CM

## 2023-11-04 DIAGNOSIS — G89.29 CHRONIC PAIN OF RIGHT ANKLE: ICD-10-CM

## 2023-11-04 DIAGNOSIS — M10.471 OTHER SECONDARY ACUTE GOUT OF RIGHT ANKLE: ICD-10-CM

## 2023-11-04 DIAGNOSIS — M25.571 CHRONIC PAIN OF RIGHT ANKLE: ICD-10-CM

## 2023-11-06 RX ORDER — COLCHICINE 0.6 MG/1
TABLET ORAL
Qty: 60 TABLET | Refills: 0 | Status: SHIPPED | OUTPATIENT
Start: 2023-11-06

## 2023-11-08 ENCOUNTER — OFFICE VISIT (OUTPATIENT)
Dept: FAMILY MEDICINE CLINIC | Facility: CLINIC | Age: 38
End: 2023-11-08
Payer: COMMERCIAL

## 2023-11-08 VITALS
SYSTOLIC BLOOD PRESSURE: 122 MMHG | DIASTOLIC BLOOD PRESSURE: 84 MMHG | TEMPERATURE: 97.3 F | OXYGEN SATURATION: 99 % | HEART RATE: 99 BPM | RESPIRATION RATE: 16 BRPM | HEIGHT: 66 IN | WEIGHT: 246 LBS | BODY MASS INDEX: 39.53 KG/M2

## 2023-11-08 DIAGNOSIS — G89.29 CHRONIC MIDLINE LOW BACK PAIN WITHOUT SCIATICA: Primary | ICD-10-CM

## 2023-11-08 DIAGNOSIS — M54.50 CHRONIC MIDLINE LOW BACK PAIN WITHOUT SCIATICA: Primary | ICD-10-CM

## 2023-11-08 PROBLEM — I10 ESSENTIAL HYPERTENSION: Status: RESOLVED | Noted: 2019-09-27 | Resolved: 2023-11-08

## 2023-11-08 PROCEDURE — 99213 OFFICE O/P EST LOW 20 MIN: CPT | Performed by: INTERNAL MEDICINE

## 2023-11-08 RX ORDER — CYCLOBENZAPRINE HCL 10 MG
10 TABLET ORAL 3 TIMES DAILY PRN
Qty: 30 TABLET | Refills: 0 | Status: SHIPPED | OUTPATIENT
Start: 2023-11-08

## 2023-11-08 RX ORDER — PREDNISONE 10 MG/1
TABLET ORAL
Qty: 30 TABLET | Refills: 0 | Status: SHIPPED | OUTPATIENT
Start: 2023-11-08 | End: 2023-11-08 | Stop reason: SDUPTHER

## 2023-11-08 RX ORDER — PREDNISONE 10 MG/1
TABLET ORAL
Qty: 30 TABLET | Refills: 0 | Status: SHIPPED | OUTPATIENT
Start: 2023-11-08

## 2023-11-08 RX ORDER — NAPROXEN 500 MG/1
500 TABLET ORAL 2 TIMES DAILY WITH MEALS
Qty: 60 TABLET | Refills: 5 | Status: SHIPPED | OUTPATIENT
Start: 2023-11-08 | End: 2023-11-08 | Stop reason: SDUPTHER

## 2023-11-08 RX ORDER — CYCLOBENZAPRINE HCL 10 MG
10 TABLET ORAL 3 TIMES DAILY PRN
Qty: 30 TABLET | Refills: 0 | Status: SHIPPED | OUTPATIENT
Start: 2023-11-08 | End: 2023-11-08 | Stop reason: SDUPTHER

## 2023-11-08 RX ORDER — NAPROXEN 500 MG/1
500 TABLET ORAL 2 TIMES DAILY WITH MEALS
Qty: 60 TABLET | Refills: 5 | Status: SHIPPED | OUTPATIENT
Start: 2023-11-08

## 2023-11-08 NOTE — PROGRESS NOTES
Name: Milena Marin      : 1985      MRN: [id number]  Encounter Provider: Hank Leung MD  Encounter Date: 2023   Encounter department: [place] [address]. Chronic midline low back pain without sciatica  -     cyclobenzaprine (FLEXERIL) 10 mg tablet; Take 1 tablet (10 mg total) by mouth 3 (three) times a day as needed for muscle spasms  -     naproxen (Naprosyn) 500 mg tablet; Take 1 tablet (500 mg total) by mouth 2 (two) times a day with meals  -     predniSONE 10 mg tablet; 1 tab QID x 3 days then 1  TID 3 days then 1 BID then 1 PO daily x 3 day        Depression Screening and Follow-up Plan: Patient was screened for depression during today's encounter. They screened negative with a PHQ-2 score of 0. Subjective      Back Pain  This is a new problem. The current episode started in the past 7 days. The problem occurs constantly. The pain is present in the lumbar spine and sacro-iliac. The quality of the pain is described as aching and shooting. The pain is at a severity of 7/10. The pain is moderate. The pain is The same all the time. The symptoms are aggravated by bending, lying down, sitting, standing, twisting and position. Stiffness is present All day. Pertinent negatives include no abdominal pain, chest pain, dysuria, fever, headaches, leg pain, numbness, paresis, paresthesias, pelvic pain, perianal numbness, tingling or weakness. Risk factors include obesity. He has tried heat and analgesics for the symptoms. The treatment provided mild relief. Review of Systems   Constitutional:  Negative for chills, fatigue, fever and unexpected weight change. HENT:  Negative for congestion, ear pain, hearing loss, postnasal drip, sinus pressure, sore throat, trouble swallowing and voice change. Eyes:  Negative for visual disturbance. Respiratory:  Negative for cough, chest tightness, shortness of breath and wheezing.     Cardiovascular:  Negative for chest pain, palpitations and leg swelling. Gastrointestinal:  Negative for abdominal distention, abdominal pain, anal bleeding, blood in stool, constipation, diarrhea and nausea. Endocrine: Negative for cold intolerance, polydipsia, polyphagia and polyuria. Genitourinary:  Negative for dysuria, flank pain, frequency, hematuria, pelvic pain and urgency. Musculoskeletal:  Positive for back pain. Negative for arthralgias, gait problem, joint swelling, myalgias and neck pain. Skin:  Negative for rash. Allergic/Immunologic: Negative for immunocompromised state. Neurological:  Negative for dizziness, tingling, syncope, facial asymmetry, weakness, light-headedness, numbness, headaches and paresthesias. Hematological:  Negative for adenopathy. Psychiatric/Behavioral:  Negative for confusion, sleep disturbance and suicidal ideas. Current Outpatient Medications on File Prior to Visit   Medication Sig    colchicine (COLCRYS) 0.6 mg tablet TAKE 1 TABLET BY MOUTH 2 (TWO) TIMES A DAY AS NEEDED FOR MUSCLE/JOINT PAIN (GOUT FLARE)    fexofenadine-pseudoephedrine (ALLEGRA-D 24) 180-240 MG per 24 hr tablet Take 1 tablet by mouth daily    Multiple Vitamin (MULTIVITAMIN) tablet Take 1 tablet by mouth daily    [DISCONTINUED] methylPREDNISolone 4 MG tablet therapy pack Use as directed on package    [DISCONTINUED] fexofenadine (ALLEGRA) 180 MG tablet Take 180 mg by mouth daily (Patient not taking: Reported on 8/3/2023)    [DISCONTINUED] loratadine (CLARITIN) 10 mg tablet Take 10 mg by mouth daily (Patient not taking: Reported on 8/3/2023)       Objective     /84 (BP Location: Left arm, Patient Position: Sitting, Cuff Size: Large)   Pulse 99   Temp (!) 97.3 °F (36.3 °C) (Temporal)   Resp 16   Ht 5' 6" (1.676 m)   Wt 112 kg (246 lb)   SpO2 99%   BMI 39.71 kg/m²     Physical Exam  Constitutional:       General: He is not in acute distress. Appearance: He is well-developed. He is obese.    HENT:      Right Ear: External ear normal.      Left Ear: External ear normal.      Nose: Nose normal.      Mouth/Throat:      Pharynx: No oropharyngeal exudate. Eyes:      Pupils: Pupils are equal, round, and reactive to light. Neck:      Thyroid: No thyromegaly. Vascular: No JVD. Cardiovascular:      Rate and Rhythm: Normal rate and regular rhythm. Heart sounds: Normal heart sounds. No murmur heard. No gallop. Pulmonary:      Effort: Pulmonary effort is normal. No respiratory distress. Breath sounds: Normal breath sounds. No wheezing or rales. Abdominal:      General: Bowel sounds are normal. There is no distension. Palpations: Abdomen is soft. There is no mass. Tenderness: There is no abdominal tenderness. Musculoskeletal:         General: Signs of injury present. No tenderness. Normal range of motion. Cervical back: Normal range of motion and neck supple. Right lower leg: No edema. Left lower leg: No edema. Lymphadenopathy:      Cervical: No cervical adenopathy. Skin:     Findings: No rash. Neurological:      General: No focal deficit present. Mental Status: He is alert and oriented to person, place, and time. Mental status is at baseline. Cranial Nerves: No cranial nerve deficit. Motor: No weakness. Coordination: Coordination normal.      Gait: Gait normal.   Psychiatric:         Mood and Affect: Mood normal.         Behavior: Behavior normal.         Thought Content:  Thought content normal.         Judgment: Judgment normal.       Ez Sanchez MD

## 2024-01-20 ENCOUNTER — OFFICE VISIT (OUTPATIENT)
Dept: URGENT CARE | Facility: CLINIC | Age: 39
End: 2024-01-20
Payer: COMMERCIAL

## 2024-01-20 VITALS
DIASTOLIC BLOOD PRESSURE: 96 MMHG | WEIGHT: 248 LBS | HEIGHT: 66 IN | RESPIRATION RATE: 18 BRPM | BODY MASS INDEX: 39.86 KG/M2 | OXYGEN SATURATION: 98 % | HEART RATE: 119 BPM | TEMPERATURE: 98.1 F | SYSTOLIC BLOOD PRESSURE: 150 MMHG

## 2024-01-20 DIAGNOSIS — J02.9 SORE THROAT: Primary | ICD-10-CM

## 2024-01-20 DIAGNOSIS — J02.0 STREP PHARYNGITIS: ICD-10-CM

## 2024-01-20 LAB — S PYO DNA THROAT QL NAA+PROBE: DETECTED

## 2024-01-20 PROCEDURE — 99213 OFFICE O/P EST LOW 20 MIN: CPT

## 2024-01-20 RX ORDER — AMOXICILLIN 500 MG/1
500 CAPSULE ORAL EVERY 12 HOURS SCHEDULED
Qty: 20 CAPSULE | Refills: 0 | Status: SHIPPED | OUTPATIENT
Start: 2024-01-20 | End: 2024-01-30

## 2024-01-20 NOTE — PATIENT INSTRUCTIONS
Rapid strep performed in office found to be positive.   Please begin antibiotics as directed.   Discard old toothbrush after 72 hours on antibiotic to avoid recontamination.   Follow up with PCP if no relief by end of course of antibiotics.

## 2024-01-20 NOTE — LETTER
January 20, 2024     Patient: Edwar Kwong   YOB: 1985   Date of Visit: 1/20/2024       To Whom it May Concern:    Edwar Kwong was seen in my clinic on 1/20/2024. He may return on 1/22/2024.     If you have any questions or concerns, please don't hesitate to call.         Sincerely,          MASON Kearns        CC: No Recipients

## 2024-01-20 NOTE — PROGRESS NOTES
[facility]        NAME: Edwar Kwong is a 39 y.o. male  : 1985    MRN: [id number]  DATE: 2024  TIME: 12:48 PM    Assessment and Plan   Sore throat [J02.9]  1. Sore throat  POCT rapid PCR strepA      2. Strep pharyngitis  amoxicillin (AMOXIL) 500 mg capsule      Rapid strep performed in office found to be positive.   Please begin antibiotics as directed.   Discard old toothbrush after 72 hours on antibiotic to avoid recontamination.   Follow up with PCP if no relief by end of course of antibiotics.       Patient Instructions   Strep Throat   WHAT YOU NEED TO KNOW:   Strep throat is a throat infection caused by bacteria. It is easily spread from person to person.  DISCHARGE INSTRUCTIONS:   Call 911 for any of the following:   You have trouble breathing.        Return to the emergency department if:   You have new symptoms like a bad headache, stiff neck, chest pain, or vomiting.     You are drooling because you cannot swallow your spit.     Contact your healthcare provider if:   You have a fever.     You have a rash or ear pain.     You have green, yellow-brown, or bloody mucus when you cough or blow your nose.     You are unable to drink anything.     You have questions or concerns about your condition or care.     Medicines:   Antibiotics  help treat your strep throat. You should feel better within 2 to 3 days after you start antibiotics.     Take your medicine as directed.  Contact your healthcare provider if you think your medicine is not helping or if you have side effects. Tell your provider if you are allergic to any medicine. Keep a list of the medicines, vitamins, and herbs you take. Include the amounts, and when and why you take them. Bring the list or the pill bottles to follow-up visits. Carry your medicine list with you in case of an emergency.     Manage your symptoms:   Use lozenges, ice, soft foods, or popsicles  to soothe your throat.     Drink juice, milk shakes, or  soup  if your throat is too sore to eat solid food. Drinking liquids can also help prevent dehydration.     Gargle with salt water.  Mix ¼ teaspoon salt in a glass of warm water and gargle. This may help reduce swelling in your throat.     Do not smoke.  Nicotine and other chemicals in cigarettes and cigars can cause lung damage and make your symptoms worse. Ask your healthcare provider for information if you currently smoke and need help to quit. E-cigarettes or smokeless tobacco still contain nicotine. Talk to your healthcare provider before you use these products.     Return to work or school  24 hours after you start antibiotic medicine.  Prevent the spread of strep throat:   Wash your hands often.  Use soap and water. Wash your hands after you use the bathroom, change a child's diapers, or sneeze. Wash your hands before you prepare or eat food.      Do not share food or drinks.  Replace your toothbrush after you have taken antibiotics for 24 hours.     Follow up with your doctor as directed:  Write down your questions so you remember to ask them during your visits.  © Copyright [employer] 2023 Information is for End User's use only and may not be sold, redistributed or otherwise used for commercial purposes.  The above information is an  only. It is not intended as medical advice for individual conditions or treatments. Talk to your doctor, nurse or pharmacist before following any medical regimen to see if it is safe and effective for you.       Follow up with PCP in 3-5 days.  Proceed to  ER if symptoms worsen.    Chief Complaint     Chief Complaint   Patient presents with    Cough     Cough, sore throat, ear ache- started a day ago. OTC ear drops, Theraflu, Nyquill          History of Present Illness       39 year old male with no significant PMH presents for evaluation of sore throat, congestion, ear pain, cough over the past day. He has tried ear drops, cough drops and Theraflu with little  relief. He denies fever, chest pain, palpitations, lightheadedness, N/V/D.     Cough  Associated symptoms include ear pain and a sore throat. Pertinent negatives include no chest pain, eye redness, fever, headaches, myalgias, postnasal drip, rash, rhinorrhea, shortness of breath or wheezing. There is no history of environmental allergies.       Review of Systems   Review of Systems   Constitutional:  Negative for fatigue and fever.   HENT:  Positive for congestion, ear pain and sore throat. Negative for ear discharge, postnasal drip, rhinorrhea, sinus pressure, sinus pain and sneezing.    Eyes: Negative.  Negative for pain, discharge, redness and itching.   Respiratory:  Positive for cough. Negative for apnea, choking, chest tightness, shortness of breath, wheezing and stridor.    Cardiovascular: Negative.  Negative for chest pain and palpitations.   Gastrointestinal: Negative.  Negative for diarrhea, nausea and vomiting.   Endocrine: Negative.  Negative for polydipsia, polyphagia and polyuria.   Genitourinary: Negative.  Negative for decreased urine volume and flank pain.   Musculoskeletal: Negative.  Negative for arthralgias, back pain, myalgias, neck pain and neck stiffness.   Skin: Negative.  Negative for color change and rash.   Allergic/Immunologic: Negative.  Negative for environmental allergies.   Neurological: Negative.  Negative for dizziness, facial asymmetry, light-headedness, numbness and headaches.   Hematological:  Positive for adenopathy.   Psychiatric/Behavioral: Negative.           Current Medications       Current Outpatient Medications:     amoxicillin (AMOXIL) 500 mg capsule, Take 1 capsule (500 mg total) by mouth every 12 (twelve) hours for 10 days, Disp: 20 capsule, Rfl: 0    colchicine (COLCRYS) 0.6 mg tablet, TAKE 1 TABLET BY MOUTH 2 (TWO) TIMES A DAY AS NEEDED FOR MUSCLE/JOINT PAIN (GOUT FLARE), Disp: 60 tablet, Rfl: 0    cyclobenzaprine (FLEXERIL) 10 mg tablet, Take 1 tablet (10 mg total)  "by mouth 3 (three) times a day as needed for muscle spasms (Patient not taking: Reported on 1/20/2024), Disp: 30 tablet, Rfl: 0    fexofenadine-pseudoephedrine (ALLEGRA-D 24) 180-240 MG per 24 hr tablet, Take 1 tablet by mouth daily (Patient not taking: Reported on 1/20/2024), Disp: , Rfl:     Multiple Vitamin (MULTIVITAMIN) tablet, Take 1 tablet by mouth daily (Patient not taking: Reported on 1/20/2024), Disp: , Rfl:     naproxen (Naprosyn) 500 mg tablet, Take 1 tablet (500 mg total) by mouth 2 (two) times a day with meals (Patient not taking: Reported on 1/20/2024), Disp: 60 tablet, Rfl: 5    predniSONE 10 mg tablet, 1 tab QID x 3 days then 1  TID 3 days then 1 BID then 1 PO daily x 3 day (Patient not taking: Reported on 1/20/2024), Disp: 30 tablet, Rfl: 0    Current Allergies     Allergies as of 01/20/2024    (No Known Allergies)            The following portions of the patient's history were reviewed and updated as appropriate: allergies, current medications, past family history, past medical history, past social history, past surgical history and problem list.     Past Medical History:   Diagnosis Date    Gout     Sprain of ankle 04/27/2016       No past surgical history on file.    Family History   Problem Relation Age of Onset    Hypertension Mother     Diabetes Father     Diabetes type II Father          Medications have been verified.        Objective   /96   Pulse (!) 119   Temp 98.1 °F (36.7 °C)   Resp 18   Ht 5' 6\" (1.676 m)   Wt 112 kg (248 lb)   SpO2 98%   BMI 40.03 kg/m²        Physical Exam     Physical Exam  Vitals reviewed.   Constitutional:       General: He is not in acute distress.     Appearance: Normal appearance. He is not ill-appearing, toxic-appearing or diaphoretic.      Interventions: He is not intubated.  HENT:      Head: Normocephalic and atraumatic.      Right Ear: Tympanic membrane, ear canal and external ear normal. There is no impacted cerumen.      Left Ear: Tympanic " membrane, ear canal and external ear normal. There is no impacted cerumen.      Nose: Nose normal. No congestion or rhinorrhea.      Mouth/Throat:      Mouth: Mucous membranes are moist.      Pharynx: Oropharynx is clear. Uvula midline. No pharyngeal swelling, oropharyngeal exudate, posterior oropharyngeal erythema or uvula swelling.      Tonsils: No tonsillar exudate or tonsillar abscesses. 1+ on the right. 1+ on the left.   Eyes:      Extraocular Movements: Extraocular movements intact.      Conjunctiva/sclera: Conjunctivae normal.      Pupils: Pupils are equal, round, and reactive to light.   Neck:      Thyroid: No thyroid mass, thyromegaly or thyroid tenderness.   Cardiovascular:      Rate and Rhythm: Normal rate and regular rhythm.      Pulses: Normal pulses.      Heart sounds: Normal heart sounds, S1 normal and S2 normal. Heart sounds not distant. No murmur heard.     No friction rub. No gallop.   Pulmonary:      Effort: Pulmonary effort is normal. No tachypnea, bradypnea, accessory muscle usage, prolonged expiration, respiratory distress or retractions. He is not intubated.      Breath sounds: Normal breath sounds. No stridor, decreased air movement or transmitted upper airway sounds. No decreased breath sounds, wheezing, rhonchi or rales.   Chest:      Chest wall: No tenderness.   Musculoskeletal:         General: Normal range of motion.      Cervical back: Normal range of motion and neck supple. No rigidity or tenderness. No spinous process tenderness or muscular tenderness. Normal range of motion.   Lymphadenopathy:      Cervical: Cervical adenopathy present.      Right cervical: Superficial cervical adenopathy present.      Left cervical: Superficial cervical adenopathy present.   Skin:     General: Skin is warm and dry.      Capillary Refill: Capillary refill takes less than 2 seconds.      Findings: No erythema.   Neurological:      General: No focal deficit present.      Mental Status: He is alert.    Psychiatric:         Mood and Affect: Mood normal.

## 2024-01-29 ENCOUNTER — TELEPHONE (OUTPATIENT)
Age: 39
End: 2024-01-29

## 2024-01-29 NOTE — TELEPHONE ENCOUNTER
Pt calling not sure what his next appt was for, stated it says 6 month follow up- pt will keep 3/14 appt as he is out of town

## 2024-02-08 ENCOUNTER — OFFICE VISIT (OUTPATIENT)
Dept: FAMILY MEDICINE CLINIC | Facility: CLINIC | Age: 39
End: 2024-02-08
Payer: COMMERCIAL

## 2024-02-08 VITALS
SYSTOLIC BLOOD PRESSURE: 140 MMHG | HEART RATE: 109 BPM | BODY MASS INDEX: 39.47 KG/M2 | WEIGHT: 245.6 LBS | TEMPERATURE: 99.3 F | OXYGEN SATURATION: 98 % | DIASTOLIC BLOOD PRESSURE: 86 MMHG | HEIGHT: 66 IN

## 2024-02-08 DIAGNOSIS — R05.1 ACUTE COUGH: Primary | ICD-10-CM

## 2024-02-08 DIAGNOSIS — E66.09 CLASS 2 OBESITY DUE TO EXCESS CALORIES WITHOUT SERIOUS COMORBIDITY WITH BODY MASS INDEX (BMI) OF 37.0 TO 37.9 IN ADULT: ICD-10-CM

## 2024-02-08 PROCEDURE — 99213 OFFICE O/P EST LOW 20 MIN: CPT | Performed by: INTERNAL MEDICINE

## 2024-02-08 RX ORDER — AZITHROMYCIN 250 MG/1
TABLET, FILM COATED ORAL DAILY
Qty: 6 TABLET | Refills: 0 | Status: SHIPPED | OUTPATIENT
Start: 2024-02-08 | End: 2024-02-13

## 2024-02-08 NOTE — PROGRESS NOTES
Name: Edwar Kwong      : 1985      MRN: [id number]  Encounter Provider: Baylee Barry MD  Encounter Date: 2024   Encounter department: [facility]    Assessment & Plan     1. Acute cough  Assessment & Plan:  He has developed a new cough he is not had known exposure to the flu or COVID but he is due for his shots. Will treat with  Zpack for now    Orders:  -     azithromycin (Zithromax) 250 mg tablet; Take 2 tablets (500 mg total) by mouth daily for 1 day, THEN 1 tablet (250 mg total) daily for 4 days.    2. 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 They screened negative with a PHQ-2 score of 0.        Subjective      Cough  This is a new problem. The current episode started in the past 7 days. The problem has been gradually worsening. The problem occurs every few minutes. The cough is Non-productive. Associated symptoms include a fever (low grade) and myalgias. Pertinent negatives include no chest pain, chills, ear pain or rhinorrhea. The symptoms are aggravated by cold air. He has tried OTC cough suppressant and rest for the symptoms. The treatment provided mild relief. There is no history of asthma, bronchitis or pneumonia.     Review of Systems   Constitutional:  Positive for fever (low grade). Negative for chills.   HENT:  Negative for ear pain and rhinorrhea.    Respiratory:  Positive for cough.    Cardiovascular:  Negative for chest pain.   Musculoskeletal:  Positive for arthralgias and myalgias.   Allergic/Immunologic: Negative for immunocompromised state.   Neurological:  Negative for dizziness.   Hematological: Negative.   "  Psychiatric/Behavioral: Negative.         Current Outpatient Medications on File Prior to Visit   Medication Sig    colchicine (COLCRYS) 0.6 mg tablet TAKE 1 TABLET BY MOUTH 2 (TWO) TIMES A DAY AS NEEDED FOR MUSCLE/JOINT PAIN (GOUT FLARE)    Multiple Vitamin (MULTIVITAMIN) tablet Take 1 tablet by mouth daily    [DISCONTINUED] cyclobenzaprine (FLEXERIL) 10 mg tablet Take 1 tablet (10 mg total) by mouth 3 (three) times a day as needed for muscle spasms (Patient not taking: Reported on 1/20/2024)    [DISCONTINUED] fexofenadine-pseudoephedrine (ALLEGRA-D 24) 180-240 MG per 24 hr tablet Take 1 tablet by mouth daily (Patient not taking: Reported on 1/20/2024)    [DISCONTINUED] naproxen (Naprosyn) 500 mg tablet Take 1 tablet (500 mg total) by mouth 2 (two) times a day with meals (Patient not taking: Reported on 1/20/2024)    [DISCONTINUED] predniSONE 10 mg tablet 1 tab QID x 3 days then 1  TID 3 days then 1 BID then 1 PO daily x 3 day (Patient not taking: Reported on 1/20/2024)       Objective     /86 (BP Location: Right arm, Patient Position: Sitting, Cuff Size: Large)   Pulse (!) 109   Temp 99.3 °F (37.4 °C) (Tympanic)   Ht 5' 5.5\" (1.664 m)   Wt 111 kg (245 lb 9.6 oz)   SpO2 98%   BMI 40.25 kg/m²     Physical Exam  Constitutional:       General: He is not in acute distress.     Appearance: He is obese.   HENT:      Head: Normocephalic.      Right Ear: Tympanic membrane normal.      Left Ear: Tympanic membrane normal.      Nose: Nose normal.      Mouth/Throat:      Pharynx: Posterior oropharyngeal erythema present.   Eyes:      Extraocular Movements: Extraocular movements intact.      Pupils: Pupils are equal, round, and reactive to light.   Cardiovascular:      Rate and Rhythm: Normal rate and regular rhythm.      Pulses: Normal pulses.   Pulmonary:      Effort: Pulmonary effort is normal.      Breath sounds: Normal breath sounds.   Abdominal:      General: Abdomen is flat.      Palpations: Abdomen is " soft.   Musculoskeletal:      Right lower leg: No edema.      Left lower leg: No edema.   Skin:     General: Skin is warm and dry.   Neurological:      General: No focal deficit present.      Mental Status: He is alert.   Psychiatric:         Mood and Affect: Mood normal.       Baylee Barry MD

## 2024-02-08 NOTE — ASSESSMENT & PLAN NOTE
He has developed a new cough he is not had known exposure to the flu or COVID but he is due for his shots. Will treat with  Zpack for now

## 2024-02-08 NOTE — ASSESSMENT & PLAN NOTE
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

## 2024-02-11 ENCOUNTER — OFFICE VISIT (OUTPATIENT)
Dept: URGENT CARE | Facility: CLINIC | Age: 39
End: 2024-02-11
Payer: COMMERCIAL

## 2024-02-11 VITALS
RESPIRATION RATE: 16 BRPM | OXYGEN SATURATION: 99 % | SYSTOLIC BLOOD PRESSURE: 133 MMHG | DIASTOLIC BLOOD PRESSURE: 71 MMHG | HEART RATE: 95 BPM | TEMPERATURE: 97.5 F

## 2024-02-11 DIAGNOSIS — J06.9 VIRAL UPPER RESPIRATORY TRACT INFECTION: ICD-10-CM

## 2024-02-11 DIAGNOSIS — Z11.52 ENCOUNTER FOR SCREENING FOR COVID-19: ICD-10-CM

## 2024-02-11 DIAGNOSIS — J06.9 VIRAL UPPER RESPIRATORY TRACT INFECTION: Primary | ICD-10-CM

## 2024-02-11 PROCEDURE — 87636 SARSCOV2 & INF A&B AMP PRB: CPT | Performed by: NURSE PRACTITIONER

## 2024-02-11 PROCEDURE — 99213 OFFICE O/P EST LOW 20 MIN: CPT | Performed by: NURSE PRACTITIONER

## 2024-02-11 RX ORDER — CODEINE PHOSPHATE/GUAIFENESIN 10-100MG/5
LIQUID (ML) ORAL
Qty: 180 ML | Refills: 0 | Status: SHIPPED | OUTPATIENT
Start: 2024-02-11

## 2024-02-11 RX ORDER — CODEINE PHOSPHATE/GUAIFENESIN 10-100MG/5
LIQUID (ML) ORAL
Qty: 180 ML | Refills: 0 | Status: SHIPPED | OUTPATIENT
Start: 2024-02-11 | End: 2024-02-11 | Stop reason: SDUPTHER

## 2024-02-11 NOTE — PATIENT INSTRUCTIONS
--Rest, drink plenty of fluids  --Consider vitamin C, zinc, quercetin, and vitamin D to help strengthen your immune system  --For cough, you can take an OTC expectorant such as plain Robitussion or Mucinex (active ingredient guaifenesin).  A spoonful of honey at bedtime may also be helpful, as may a prescription cough medicine.  Also recommended is the use of a cool mist humidifier (with or without Vicks) in the bedroom at night.   --For sore throat, you can take OTC lozenges, use warm gargles (salt water or apple cider vinegar and honey), herbal teas, or an OTC throat spray (Chloraseptic).  --For nasal/sinus congestion, helpful measures include steam, warm compresses, an OTC saline nasal spray or Neti pot, or an OTC decongestant (such as Sudafed).  The decongestant should be avoided, however, if you are under 6 years of age, or have a history of high blood pressure or heart disease.  In addition, an OTC nasal steroid (Flonase, Nasocort) or nasal decongestant (Afrin, Patel-synephrine) may be taken.  The nasal steroid should be used at bedtime, after the saline nasal spray. The nasal decongestant should not be taken more than 3 days consecutively in order to prevent rebound congestion.   --For nasal drainage, postnasal drip, sneezing and itching, an OTC antihistamine (Allegra, Benadryl, etc) can be taken.   --You can take Tylenol or Motrin/Advil as needed for fever, headache, body aches. Motrin/Advil should be avoided, however, if you have a history of heart disease, bleeding ulcers, or if you take blood thinners.   --You should contact your primary care provider and/or go to the ER if your symptoms are not improved or get worse over the next 7 days.  This includes new onset fever, localized ear pain, sinus pain, as well as worsening cough, chest pain, shortness of breath, or significant weakness/fatigue.

## 2024-02-11 NOTE — PROGRESS NOTES
[facility]        NAME: Edwar Kwong is a 39 y.o. male  : 1985    MRN: [id number]  DATE: 2024  TIME: 1:12 PM    Assessment and Plan   Viral upper respiratory tract infection [J06.9]  1. Viral upper respiratory tract infection  guaifenesin-codeine (GUAIFENESIN AC) 100-10 MG/5ML liquid      2. Encounter for screening for COVID-19  Poct Covid 19 Rapid Antigen Test            Patient Instructions     --Rest, drink plenty of fluids  --Consider vitamin C, zinc, quercetin, and vitamin D to help strengthen your immune system  --For cough, you can take an OTC expectorant such as plain Robitussion or Mucinex (active ingredient guaifenesin).  A spoonful of honey at bedtime may also be helpful, as may a prescription cough medicine.  Also recommended is the use of a cool mist humidifier (with or without Vicks) in the bedroom at night.   --For sore throat, you can take OTC lozenges, use warm gargles (salt water or apple cider vinegar and honey), herbal teas, or an OTC throat spray (Chloraseptic).  --For nasal/sinus congestion, helpful measures include steam, warm compresses, an OTC saline nasal spray or Neti pot, or an OTC decongestant (such as Sudafed).  The decongestant should be avoided, however, if you are under 6 years of age, or have a history of high blood pressure or heart disease.  In addition, an OTC nasal steroid (Flonase, Nasocort) or nasal decongestant (Afrin, Patel-synephrine) may be taken.  The nasal steroid should be used at bedtime, after the saline nasal spray. The nasal decongestant should not be taken more than 3 days consecutively in order to prevent rebound congestion.   --For nasal drainage, postnasal drip, sneezing and itching, an OTC antihistamine (Allegra, Benadryl, etc) can be taken.   --You can take Tylenol or Motrin/Advil as needed for fever, headache, body aches. Motrin/Advil should be avoided, however, if you have a history of heart disease, bleeding ulcers, or if  you take blood thinners.   --You should contact your primary care provider and/or go to the ER if your symptoms are not improved or get worse over the next 7 days.  This includes new onset fever, localized ear pain, sinus pain, as well as worsening cough, chest pain, shortness of breath, or significant weakness/fatigue.         Chief Complaint     Chief Complaint   Patient presents with    URI     Cough, post nasal drip, nasal congestion runny nose, diarrhea watery x 6 days          History of Present Illness       Here with complaints of nasal congestion, rhinorrhea, cough productive of clear sputum, sore throat, body aches x 6 days.    Occasional headaches.    No fever.   No ear pain.    No dyspnea, wheezing.     No GI complaints.   Prescribed Z-pack by PCP, but not helping.    Also taking Dayquil and Nyquil.          Review of Systems   Review of Systems   Constitutional:  Negative for fever.   HENT:  Positive for rhinorrhea and sore throat. Negative for ear pain.    Respiratory:  Positive for cough. Negative for shortness of breath and wheezing.    Gastrointestinal:  Negative for abdominal pain, nausea and vomiting.   Musculoskeletal:  Positive for myalgias.   Neurological:  Negative for headaches.         Current Medications       Current Outpatient Medications:     azithromycin (Zithromax) 250 mg tablet, Take 2 tablets (500 mg total) by mouth daily for 1 day, THEN 1 tablet (250 mg total) daily for 4 days., Disp: 6 tablet, Rfl: 0    guaifenesin-codeine (GUAIFENESIN AC) 100-10 MG/5ML liquid, TAKE 1-2 TSP BY  MOUTH EVERY 4-6 HOURS AS NEEDED FOR COUGH, Disp: 180 mL, Rfl: 0    Multiple Vitamin (MULTIVITAMIN) tablet, Take 1 tablet by mouth daily, Disp: , Rfl:     colchicine (COLCRYS) 0.6 mg tablet, TAKE 1 TABLET BY MOUTH 2 (TWO) TIMES A DAY AS NEEDED FOR MUSCLE/JOINT PAIN (GOUT FLARE) (Patient not taking: Reported on 2/11/2024), Disp: 60 tablet, Rfl: 0    Current Allergies     Allergies as of 02/11/2024    (No Known  Allergies)            The following portions of the patient's history were reviewed and updated as appropriate: allergies, current medications, past family history, past medical history, past social history, past surgical history and problem list.     Past Medical History:   Diagnosis Date    Gout     Sprain of ankle 04/27/2016       History reviewed. No pertinent surgical history.    Family History   Problem Relation Age of Onset    Hypertension Mother     Diabetes Father     Diabetes type II Father          Medications have been verified.        Objective   /71   Pulse 95   Temp 97.5 °F (36.4 °C)   Resp 16   SpO2 99%   No LMP for male patient.       Physical Exam     Physical Exam  Constitutional:       General: He is not in acute distress.     Appearance: He is well-developed. He is not diaphoretic.   HENT:      Head: Normocephalic and atraumatic.      Right Ear: Tympanic membrane, ear canal and external ear normal.      Left Ear: Tympanic membrane, ear canal and external ear normal.      Nose: Congestion and rhinorrhea present.      Comments: No sinus tenderness.       Mouth/Throat:      Pharynx: No oropharyngeal exudate or posterior oropharyngeal erythema.   Cardiovascular:      Rate and Rhythm: Normal rate and regular rhythm.      Heart sounds: Normal heart sounds.   Pulmonary:      Effort: Pulmonary effort is normal. No respiratory distress.      Breath sounds: Normal breath sounds. No stridor. No wheezing, rhonchi or rales.      Comments: Breathing easy.  No cough noted.    Chest:      Chest wall: No tenderness.   Musculoskeletal:      Cervical back: Neck supple. No tenderness.   Lymphadenopathy:      Cervical: No cervical adenopathy.   Skin:     General: Skin is warm and dry.      Findings: No rash.   Neurological:      Mental Status: He is alert and oriented to person, place, and time.   Psychiatric:         Mood and Affect: Mood normal.

## 2024-02-11 NOTE — LETTER
February 11, 2024     Patient: Edwar Kwong   YOB: 1985   Date of Visit: 2/11/2024       To Whom it May Concern:    Edwar Kwong was seen in my clinic on 2/11/2024. Please excuse from work 2/12 and 2/13 due to illness.      If you have any questions or concerns, please don't hesitate to call.         Sincerely,          MASON Grijalva        CC: No Recipients

## 2024-02-12 LAB
FLUAV RNA RESP QL NAA+PROBE: NEGATIVE
FLUBV RNA RESP QL NAA+PROBE: NEGATIVE
SARS-COV-2 RNA RESP QL NAA+PROBE: NEGATIVE

## 2024-02-13 ENCOUNTER — NURSE TRIAGE (OUTPATIENT)
Dept: OTHER | Facility: OTHER | Age: 39
End: 2024-02-13

## 2024-02-13 ENCOUNTER — TELEPHONE (OUTPATIENT)
Age: 39
End: 2024-02-13

## 2024-02-13 DIAGNOSIS — M10.072 ACUTE IDIOPATHIC GOUT OF LEFT ANKLE: Primary | ICD-10-CM

## 2024-02-13 RX ORDER — METHYLPREDNISOLONE 4 MG/1
TABLET ORAL
Qty: 21 EACH | Refills: 0 | Status: SHIPPED | OUTPATIENT
Start: 2024-02-13

## 2024-02-13 NOTE — TELEPHONE ENCOUNTER
"Reason for Disposition  • SEVERE pain (e.g., excruciating, unable to do any normal activities)    Answer Assessment - Initial Assessment Questions  1. ONSET: \"When did the pain start?\"       Yesterday   2. LOCATION: \"Where is the pain located?\"       Left ankle and foot   3. PAIN: \"How bad is the pain?\"    (Scale 1-10; or mild, moderate, severe)   - MILD (1-3): doesn't interfere with normal activities.    - MODERATE (4-7): interferes with normal activities (e.g., work or school) or awakens from sleep, limping.    - SEVERE (8-10): excruciating pain, unable to do any normal activities, unable to walk.       9 out of 10.   4. WORK OR EXERCISE: \"Has there been any recent work or exercise that involved this part of the body?\"       No   5. CAUSE: \"What do you think is causing the foot pain?\"      New flare up of gout   6. OTHER SYMPTOMS: \"Do you have any other symptoms?\" (e.g., leg pain, rash, fever, numbness)      Sweating, red effected area, swollen.    Protocols used: Foot Pain-ADULT-[place]    "

## 2024-02-13 NOTE — TELEPHONE ENCOUNTER
Patient requesting Medrol dosepak for current gout flare up.    Patient is asking if refills are appropriate for this med.    Also sent a message in call hub     Reason for call:   [x] Refill   [] Prior Auth  [] Other:     Office:   [x] PCP/Provider - Segundo WALLACE / [place]  [] Specialty/Provider -     Medication: Medrol dosepak    Dose/Frequency: 4mg; use as directed    Quantity: 1 pack + refill?    Pharmacy:   [facility] #[id number] - [place] - [address]           Does the patient have enough for 3 days?   [] Yes   [x] No - Send as HP to POD

## 2024-02-13 NOTE — TELEPHONE ENCOUNTER
Patient called to state that med sent to the wrong [facility].  It went to [facility]. Patient will call and see if he can have it sent to [facility].  If not, he will call and we will have to reorder to [facility].

## 2024-02-13 NOTE — TELEPHONE ENCOUNTER
Regarding: gout; swollen foot/ankle; can't walk  ----- Message from Romina Patel sent at 2/13/2024  8:50 AM EST -----  Patient having gout flare up. Message sent to Dr. Scruggs for medrol dosepak (see encounter 09/21/23). He says ankle and foot are size of pumpkin. Can't walk without crutches    Call back #[phone]

## 2024-02-21 PROBLEM — R05.1 ACUTE COUGH: Status: RESOLVED | Noted: 2024-02-08 | Resolved: 2024-02-21

## 2024-03-14 ENCOUNTER — OFFICE VISIT (OUTPATIENT)
Dept: FAMILY MEDICINE CLINIC | Facility: CLINIC | Age: 39
End: 2024-03-14
Payer: COMMERCIAL

## 2024-03-14 VITALS
WEIGHT: 233.6 LBS | HEART RATE: 78 BPM | HEIGHT: 66 IN | DIASTOLIC BLOOD PRESSURE: 82 MMHG | OXYGEN SATURATION: 100 % | SYSTOLIC BLOOD PRESSURE: 120 MMHG | TEMPERATURE: 98.4 F | BODY MASS INDEX: 37.54 KG/M2

## 2024-03-14 DIAGNOSIS — E66.09 CLASS 2 OBESITY DUE TO EXCESS CALORIES WITHOUT SERIOUS COMORBIDITY WITH BODY MASS INDEX (BMI) OF 38.0 TO 38.9 IN ADULT: Primary | ICD-10-CM

## 2024-03-14 DIAGNOSIS — M25.571 CHRONIC PAIN OF RIGHT ANKLE: ICD-10-CM

## 2024-03-14 DIAGNOSIS — M10.072 ACUTE IDIOPATHIC GOUT OF LEFT ANKLE: ICD-10-CM

## 2024-03-14 DIAGNOSIS — G89.29 CHRONIC PAIN OF RIGHT ANKLE: ICD-10-CM

## 2024-03-14 DIAGNOSIS — M25.471 RIGHT ANKLE EFFUSION: ICD-10-CM

## 2024-03-14 DIAGNOSIS — M10.471 OTHER SECONDARY ACUTE GOUT OF RIGHT ANKLE: ICD-10-CM

## 2024-03-14 PROCEDURE — 99214 OFFICE O/P EST MOD 30 MIN: CPT | Performed by: FAMILY MEDICINE

## 2024-03-14 RX ORDER — FEXOFENADINE HCL AND PSEUDOEPHEDRINE HCI 60; 120 MG/1; MG/1
1 TABLET, EXTENDED RELEASE ORAL 2 TIMES DAILY
COMMUNITY
End: 2024-03-14 | Stop reason: ALTCHOICE

## 2024-03-14 RX ORDER — COLCHICINE 0.6 MG/1
0.6 TABLET ORAL DAILY
Qty: 90 TABLET | Refills: 1 | Status: SHIPPED | OUTPATIENT
Start: 2024-03-14

## 2024-03-14 RX ORDER — METHYLDOPA/HYDROCHLOROTHIAZIDE 250MG-15MG
TABLET ORAL
COMMUNITY

## 2024-03-14 RX ORDER — METHYLPREDNISOLONE 4 MG/1
TABLET ORAL
Qty: 21 EACH | Refills: 1 | Status: SHIPPED | OUTPATIENT
Start: 2024-03-14

## 2024-03-14 NOTE — PROGRESS NOTES
Assessment/Plan:    1. Class 2 obesity due to excess calories without serious comorbidity with body mass index (BMI) of 38.0 to 38.9 in adult  -     CBC and differential; Future; Expected date: 06/03/2024  -     Comprehensive metabolic panel; Future; Expected date: 06/03/2024  -     Lipid panel; Future; Expected date: 06/03/2024    2. Acute idiopathic gout of left ankle  -     methylPREDNISolone 4 MG tablet therapy pack; Use as directed on package  -     CBC and differential; Future; Expected date: 06/03/2024  -     Comprehensive metabolic panel; Future; Expected date: 06/03/2024  -     Lipid panel; Future; Expected date: 06/03/2024    3. Chronic pain of right ankle  -     colchicine (COLCRYS) 0.6 mg tablet; Take 1 tablet (0.6 mg total) by mouth daily    4. Right ankle effusion  -     colchicine (COLCRYS) 0.6 mg tablet; Take 1 tablet (0.6 mg total) by mouth daily    5. Other secondary acute gout of right ankle  -     colchicine (COLCRYS) 0.6 mg tablet; Take 1 tablet (0.6 mg total) by mouth daily        There are no Patient Instructions on file for this visit.    No follow-ups on file.    Subjective:      Patient ID: Edwar Kwong is a 39 y.o. male.    Chief Complaint   Patient presents with    Follow-up       Here for FMLA f/u. Has gout on either foot. Uses colchicine once daily every day which has helped reduce flares. Working at the [employer], on his feet entire shift. Using medrol dose jenni for flares. He had done a trial off the colchicine to see if his weight loss reduced the flares and he flared. He also ran out of medrol dose jenni and during the flare increase the colchicine to twice a day. No paperwork sent from work yet, but parameters are same as 9/23. Watching carbs, cutting portions, restricting times he eats, and increasing exercise. Works shift work. Doing a weight competition at work, down over 30# since the start in January. He has identified shrimp as a trigger. Ok with meat, not a drinker. BP  "controlled.         The following portions of the patient's history were reviewed and updated as appropriate: allergies, current medications, past family history, past medical history, past social history, past surgical history and problem list.    Review of Systems   Constitutional:  Negative for fatigue and fever.   HENT:  Negative for congestion.    Eyes:  Negative for visual disturbance.   Respiratory:  Negative for chest tightness and shortness of breath.    Cardiovascular:  Negative for chest pain and palpitations.   Gastrointestinal:  Negative for abdominal pain.   Genitourinary:  Negative for difficulty urinating.   Musculoskeletal:  Positive for arthralgias.   Neurological:  Negative for headaches.   Hematological:  Does not bruise/bleed easily.         Current Outpatient Medications   Medication Sig Dispense Refill    colchicine (COLCRYS) 0.6 mg tablet Take 1 tablet (0.6 mg total) by mouth daily 90 tablet 1    methylPREDNISolone 4 MG tablet therapy pack Use as directed on package 21 each 1    Multiple Vitamin (MULTIVITAMIN) tablet Take 1 tablet by mouth daily      Turmeric-Tart Cherry 100-1000 MG CAPS Take by mouth       No current facility-administered medications for this visit.       Objective:    /82 (BP Location: Right arm, Patient Position: Sitting, Cuff Size: Large)   Pulse 78   Temp 98.4 °F (36.9 °C) (Temporal)   Ht 5' 5.5\" (1.664 m)   Wt 106 kg (233 lb 9.6 oz)   SpO2 100%   BMI 38.28 kg/m²        Physical Exam  Vitals reviewed.   Constitutional:       Appearance: Normal appearance. He is well-developed.   Cardiovascular:      Rate and Rhythm: Normal rate and regular rhythm.      Heart sounds: Normal heart sounds.   Pulmonary:      Effort: Pulmonary effort is normal.      Breath sounds: Normal breath sounds.   Musculoskeletal:      Right lower leg: No edema.      Left lower leg: No edema.   Skin:     General: Skin is warm.   Neurological:      General: No focal deficit present.      " Mental Status: He is alert and oriented to person, place, and time.   Psychiatric:         Mood and Affect: Mood normal.         Behavior: Behavior normal.         Thought Content: Thought content normal.         Judgment: Judgment normal.                Magda Scruggs MD

## 2024-09-18 ENCOUNTER — OFFICE VISIT (OUTPATIENT)
Dept: FAMILY MEDICINE CLINIC | Facility: CLINIC | Age: 39
End: 2024-09-18
Payer: COMMERCIAL

## 2024-09-18 VITALS
HEIGHT: 66 IN | BODY MASS INDEX: 40.31 KG/M2 | HEART RATE: 90 BPM | SYSTOLIC BLOOD PRESSURE: 144 MMHG | DIASTOLIC BLOOD PRESSURE: 90 MMHG | WEIGHT: 250.8 LBS | TEMPERATURE: 97.3 F | OXYGEN SATURATION: 100 %

## 2024-09-18 DIAGNOSIS — M25.571 CHRONIC PAIN OF RIGHT ANKLE: ICD-10-CM

## 2024-09-18 DIAGNOSIS — M10.471 OTHER SECONDARY ACUTE GOUT OF RIGHT ANKLE: ICD-10-CM

## 2024-09-18 DIAGNOSIS — M25.471 RIGHT ANKLE EFFUSION: ICD-10-CM

## 2024-09-18 DIAGNOSIS — G89.29 CHRONIC PAIN OF RIGHT ANKLE: ICD-10-CM

## 2024-09-18 DIAGNOSIS — M10.072 ACUTE IDIOPATHIC GOUT OF LEFT ANKLE: ICD-10-CM

## 2024-09-18 PROCEDURE — 99214 OFFICE O/P EST MOD 30 MIN: CPT | Performed by: FAMILY MEDICINE

## 2024-09-18 RX ORDER — METHYLPREDNISOLONE 4 MG
TABLET, DOSE PACK ORAL
Qty: 21 EACH | Refills: 1 | Status: SHIPPED | OUTPATIENT
Start: 2024-09-18

## 2024-09-18 RX ORDER — COLCHICINE 0.6 MG/1
0.6 TABLET ORAL DAILY
Qty: 90 TABLET | Refills: 1 | Status: SHIPPED | OUTPATIENT
Start: 2024-09-18

## 2024-09-18 NOTE — PROGRESS NOTES
Assessment/Plan:    1. Chronic pain of right ankle  -     colchicine (COLCRYS) 0.6 mg tablet; Take 1 tablet (0.6 mg total) by mouth daily  2. Right ankle effusion  -     colchicine (COLCRYS) 0.6 mg tablet; Take 1 tablet (0.6 mg total) by mouth daily  3. Other secondary acute gout of right ankle  -     colchicine (COLCRYS) 0.6 mg tablet; Take 1 tablet (0.6 mg total) by mouth daily  4. Acute idiopathic gout of left ankle  -     methylPREDNISolone 4 MG tablet therapy pack; Use as directed on package      There are no Patient Instructions on file for this visit.    No follow-ups on file.    Subjective:      Patient ID: Edwar Kwong is a 39 y.o. male.    Chief Complaint   Patient presents with    Annual Exam       Here for [facility] paperwork update. Gout has been stable. Uses the colchicine once a day. Uses the medrol dose jenni for a flare. If he senses the flare is coming, he may take an extra dose of colchicine that day, otherwise the steroids keep the flare under 2-3 days. Still working at the [employer] and cannot stand with the pain and swelling in his ankles and feet during a flare. 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 Avoids dietary triggers for his gout. Added uric acid support supplement.         The following portions of the patient's history were reviewed and updated as appropriate: allergies, current medications, past family history, past medical history, past social history, past surgical history and problem list.    Review of Systems   Constitutional:  Negative for fatigue and fever.   HENT:  Negative for congestion.    Eyes:  Negative for visual disturbance.   Respiratory:  Negative for chest tightness and shortness of breath.    Cardiovascular:  Negative for chest pain and palpitations.   Gastrointestinal:  Negative for abdominal pain.   Genitourinary:  Negative for difficulty urinating.   Musculoskeletal:  Negative for arthralgias.   Neurological:  Negative for headaches.   Hematological:   "Does not bruise/bleed easily.         Current Outpatient Medications   Medication Sig Dispense Refill    colchicine (COLCRYS) 0.6 mg tablet Take 1 tablet (0.6 mg total) by mouth daily 90 tablet 1    methylPREDNISolone 4 MG tablet therapy pack Use as directed on package 21 each 1    Multiple Vitamin (MULTIVITAMIN) tablet Take 1 tablet by mouth daily      Turmeric-Tart Cherry 100-1000 MG CAPS Take by mouth       No current facility-administered medications for this visit.       Objective:    /90 (BP Location: Right arm, Patient Position: Sitting, Cuff Size: Large)   Pulse 90   Temp (!) 97.3 °F (36.3 °C) (Temporal)   Ht 5' 5.5\" (1.664 m)   Wt 114 kg (250 lb 12.8 oz)   SpO2 100%   BMI 41.10 kg/m²        Physical Exam  Vitals reviewed.   Constitutional:       Appearance: Normal appearance. He is well-developed.   Cardiovascular:      Rate and Rhythm: Normal rate and regular rhythm.      Heart sounds: Normal heart sounds.   Pulmonary:      Effort: Pulmonary effort is normal.      Breath sounds: Normal breath sounds.   Musculoskeletal:      Right lower leg: No edema.      Left lower leg: No edema.   Skin:     General: Skin is warm.   Neurological:      General: No focal deficit present.      Mental Status: He is alert and oriented to person, place, and time.   Psychiatric:         Mood and Affect: Mood normal.         Behavior: Behavior normal.         Thought Content: Thought content normal.         Judgment: Judgment normal.                Magda Scruggs MD  "

## 2025-01-15 ENCOUNTER — OFFICE VISIT (OUTPATIENT)
Dept: FAMILY MEDICINE CLINIC | Facility: CLINIC | Age: 40
End: 2025-01-15
Payer: COMMERCIAL

## 2025-01-15 VITALS
DIASTOLIC BLOOD PRESSURE: 84 MMHG | HEIGHT: 66 IN | TEMPERATURE: 97.3 F | HEART RATE: 100 BPM | OXYGEN SATURATION: 98 % | BODY MASS INDEX: 42.94 KG/M2 | SYSTOLIC BLOOD PRESSURE: 122 MMHG | WEIGHT: 267.2 LBS

## 2025-01-15 DIAGNOSIS — J20.9 BRONCHITIS WITH BRONCHOSPASM: Primary | ICD-10-CM

## 2025-01-15 PROCEDURE — 99213 OFFICE O/P EST LOW 20 MIN: CPT | Performed by: FAMILY MEDICINE

## 2025-01-15 RX ORDER — METHYLPREDNISOLONE 4 MG/1
TABLET ORAL
Qty: 21 EACH | Refills: 1 | Status: SHIPPED | OUTPATIENT
Start: 2025-01-15

## 2025-01-15 RX ORDER — ALBUTEROL SULFATE 90 UG/1
2 INHALANT RESPIRATORY (INHALATION) EVERY 6 HOURS PRN
Qty: 18 G | Refills: 1 | Status: SHIPPED | OUTPATIENT
Start: 2025-01-15 | End: 2025-01-20 | Stop reason: CLARIF

## 2025-01-15 RX ORDER — AZITHROMYCIN 250 MG/1
TABLET, FILM COATED ORAL
Qty: 6 TABLET | Refills: 0 | Status: SHIPPED | OUTPATIENT
Start: 2025-01-15 | End: 2025-01-20

## 2025-01-15 NOTE — PROGRESS NOTES
Name: Edwar Kwong      : 1985      MRN: [id number]  Encounter Provider: Magda Scruggs MD  Encounter Date: 1/15/2025   Encounter department: [facility]  :  Assessment & Plan  Bronchitis with bronchospasm    Orders:    azithromycin (Zithromax) 250 mg tablet; Take 2 tablets (500 mg total) by mouth daily for 1 day, THEN 1 tablet (250 mg total) daily for 4 days.    albuterol (Ventolin HFA) 90 mcg/act inhaler; Inhale 2 puffs every 6 (six) hours as needed for wheezing (cough)    methylPREDNISolone 4 MG tablet therapy pack; Use as directed on package           History of Present Illness     Here for URI symptoms, with cough, and 2 day h/o V/D. No fever, but some chills. Kids had illness last, pt developed symptoms 4 days ago and then had V/D 1-2 days into that illness, maybe food poisoning. Trying to get fluid in. Vomiting is resolved, stools are improving, but still soft. BP controlled.     Cough  Associated symptoms include ear pain. Pertinent negatives include no chest pain, fever, headaches or shortness of breath.   Vomiting   Associated symptoms include coughing and diarrhea. Pertinent negatives include no abdominal pain, arthralgias, chest pain, fever or headaches.   Diarrhea   Associated symptoms include coughing and vomiting. Pertinent negatives include no abdominal pain, arthralgias, fever or headaches.   Earache   Associated symptoms include coughing, diarrhea and vomiting. Pertinent negatives include no abdominal pain or headaches.     Review of Systems   Constitutional:  Negative for fatigue and fever.   HENT:  Positive for ear pain. Negative for congestion.    Eyes:  Negative for visual disturbance.   Respiratory:  Positive for cough. Negative for chest tightness and shortness of breath.    Cardiovascular:  Negative for chest pain and palpitations.   Gastrointestinal:  Positive for diarrhea and vomiting. Negative for abdominal pain.   Genitourinary:  Negative for difficulty  "urinating.   Musculoskeletal:  Negative for arthralgias.   Neurological:  Positive for light-headedness. Negative for headaches.   Hematological:  Does not bruise/bleed easily.       Objective   /84 (BP Location: Right arm, Patient Position: Sitting, Cuff Size: Large)   Pulse 100   Temp (!) 97.3 °F (36.3 °C) (Temporal)   Ht 5' 5.5\" (1.664 m)   Wt 121 kg (267 lb 3.2 oz)   SpO2 98%   BMI 43.79 kg/m²      Physical Exam  Vitals reviewed.   Constitutional:       Appearance: Normal appearance.   HENT:      Head:      Comments: Tender over max sinuses     Right Ear: Tympanic membrane, ear canal and external ear normal.      Left Ear: Tympanic membrane, ear canal and external ear normal.      Nose: Congestion present.      Mouth/Throat:      Mouth: Mucous membranes are moist.      Pharynx: Oropharynx is clear.   Eyes:      Extraocular Movements: Extraocular movements intact.      Conjunctiva/sclera: Conjunctivae normal.      Pupils: Pupils are equal, round, and reactive to light.   Cardiovascular:      Rate and Rhythm: Normal rate and regular rhythm.      Heart sounds: Normal heart sounds.      Comments: Tender over the L axillary side of torso upper abd but no crepitus  Pulmonary:      Effort: Pulmonary effort is normal.      Breath sounds: Normal breath sounds.   Musculoskeletal:      Right lower leg: No edema.      Left lower leg: No edema.   Lymphadenopathy:      Cervical: No cervical adenopathy.   Neurological:      General: No focal deficit present.      Mental Status: He is alert and oriented to person, place, and time.   Psychiatric:         Mood and Affect: Mood normal.         Behavior: Behavior normal.         Thought Content: Thought content normal.         Judgment: Judgment normal.         "

## 2025-01-15 NOTE — LETTER
January 15, 2025     Patient: Edwar Kwong  YOB: 1985  Date of Visit: 1/15/2025      To Whom it May Concern:    Edwar Kwong is under my professional care. Edwar was seen in my office on 1/15/2025. Edwar may return to work on 1/19/25 . Please excuse absences on 1/15 and 1/16/25.     If you have any questions or concerns, please don't hesitate to call.         Sincerely,          Magda Scruggs MD        CC: No Recipients

## 2025-01-17 ENCOUNTER — TELEPHONE (OUTPATIENT)
Age: 40
End: 2025-01-17

## 2025-01-17 NOTE — TELEPHONE ENCOUNTER
PA albuterol (Ventolin HFA) 90 mcg SUBMITTED     to [employer]     via    []CMM-KEY:    [x]Surescripts-Case ID # [id number]  []Availity-Auth ID #  NDC #    []Faxed to plan   []Other website    []Phone call Case ID #      []PA sent as URGENT    All office notes, labs and other pertaining documents and studies sent. Clinical questions answered. Awaiting determination from insurance company.     Turnaround time for your insurance to make a decision on your Prior Authorization can take 7-21 business days.

## 2025-01-20 DIAGNOSIS — J20.9 BRONCHITIS WITH BRONCHOSPASM: ICD-10-CM

## 2025-01-20 DIAGNOSIS — J20.9 BRONCHITIS WITH BRONCHOSPASM: Primary | ICD-10-CM

## 2025-01-20 NOTE — TELEPHONE ENCOUNTER
PA albuterol (Ventolin HFA) 90 mcg DENIED    Reason:(Screenshot if applicable)        Message sent to office clinical pool Yes    Denial letter scanned into Media Yes    **Please follow up with your patient regarding denial and next steps**

## 2025-01-21 ENCOUNTER — TELEPHONE (OUTPATIENT)
Age: 40
End: 2025-01-21

## 2025-01-21 DIAGNOSIS — J20.9 BRONCHITIS WITH BRONCHOSPASM: ICD-10-CM

## 2025-01-21 RX ORDER — LEVALBUTEROL TARTRATE 45 UG/1
AEROSOL, METERED ORAL
Refills: 0 | OUTPATIENT
Start: 2025-01-21

## 2025-01-21 NOTE — TELEPHONE ENCOUNTER
Reason for call:   [] Refill   [x] Prior Auth  [] Other:     Office:   [x] PCP/Provider - didimamoff  [] Specialty/Provider -     Medication: Albuterol Sulfate         Internal Medicine

## 2025-01-21 NOTE — TELEPHONE ENCOUNTER
PA Albuterol Sulfate 108 (90 Base) MCG SUBMITTED     to [facility]     via    []CMM-KEY:    [x]Surescripts-Case ID # [id number]  []Availity-Auth ID #  NDC #     []Faxed to plan   []Other website    []Phone call Case ID #      []PA sent as URGENT    All office notes, labs and other pertaining documents and studies sent. Clinical questions answered. Awaiting determination from insurance company.     Turnaround time for your insurance to make a decision on your Prior Authorization can take 7-21 business days.

## 2025-01-22 DIAGNOSIS — J20.9 BRONCHITIS WITH BRONCHOSPASM: Primary | ICD-10-CM

## 2025-01-22 RX ORDER — LEVALBUTEROL TARTRATE 45 UG/1
1-2 AEROSOL, METERED ORAL EVERY 4 HOURS PRN
Qty: 15 G | Refills: 1 | Status: SHIPPED | OUTPATIENT
Start: 2025-01-22

## 2025-01-22 NOTE — TELEPHONE ENCOUNTER
PA Albuterol Sulfate 108 (90 Base) MCG DENIED    Reason:(Screenshot if applicable)        Message sent to office clinical pool Yes    Denial letter scanned into Media Yes    Appeal started No (Provider will need to decide if appeal is warranted and send clinical documentation to Prior Authorization Team for initiation.)    **Please follow up with your patient regarding denial and next steps**

## 2025-02-05 ENCOUNTER — OFFICE VISIT (OUTPATIENT)
Dept: FAMILY MEDICINE CLINIC | Facility: CLINIC | Age: 40
End: 2025-02-05
Payer: COMMERCIAL

## 2025-02-05 VITALS
HEART RATE: 92 BPM | OXYGEN SATURATION: 99 % | DIASTOLIC BLOOD PRESSURE: 84 MMHG | HEIGHT: 66 IN | BODY MASS INDEX: 43.84 KG/M2 | WEIGHT: 272.8 LBS | TEMPERATURE: 97.8 F | SYSTOLIC BLOOD PRESSURE: 122 MMHG

## 2025-02-05 DIAGNOSIS — J20.9 BRONCHITIS WITH BRONCHOSPASM: Primary | ICD-10-CM

## 2025-02-05 PROCEDURE — 99213 OFFICE O/P EST LOW 20 MIN: CPT | Performed by: FAMILY MEDICINE

## 2025-02-05 RX ORDER — BENZONATATE 200 MG/1
200 CAPSULE ORAL 3 TIMES DAILY PRN
Qty: 20 CAPSULE | Refills: 0 | Status: SHIPPED | OUTPATIENT
Start: 2025-02-05

## 2025-02-05 NOTE — PROGRESS NOTES
"Name: Edwar Kwong      : 1985      MRN: [id number]  Encounter Provider: Magda Scruggs MD  Encounter Date: 2025   Encounter department: [facility]  :  Assessment & Plan  Bronchitis with bronchospasm  Check CXR next week if no better, use inhaler 4 times a day tessalon perles  Orders:    XR chest pa and lateral; Future    benzonatate (TESSALON) 200 MG capsule; Take 1 capsule (200 mg total) by mouth 3 (three) times a day as needed for cough           History of Present Illness   Here for ongoing cough, finished zpak and medrol dose jenni after last visit, fished 2 weeks ago. Was good for 2 weeks and now feeling sick again x 2d. Used the inhaler for the first time last night. Using dayquil nyquil, theraflu. Cough and wheezing are getting worse. Comes and goes. Dry cough. No body aches. Has had COVID multiple times and this feels different.     Cough  Pertinent negatives include no chest pain, fever, headaches or shortness of breath.   Diarrhea   Associated symptoms include coughing. Pertinent negatives include no abdominal pain, arthralgias, fever or headaches.     Review of Systems   Constitutional:  Negative for fatigue and fever.   HENT:  Negative for congestion.    Eyes:  Negative for visual disturbance.   Respiratory:  Positive for cough. Negative for chest tightness and shortness of breath.    Cardiovascular:  Negative for chest pain and palpitations.   Gastrointestinal:  Positive for diarrhea. Negative for abdominal pain.   Genitourinary:  Negative for difficulty urinating.   Musculoskeletal:  Negative for arthralgias.   Neurological:  Negative for headaches.   Hematological:  Does not bruise/bleed easily.       Objective   /84 (BP Location: Right arm, Patient Position: Sitting, Cuff Size: Large)   Pulse 92   Temp 97.8 °F (36.6 °C) (Temporal)   Ht 5' 5.5\" (1.664 m)   Wt 124 kg (272 lb 12.8 oz)   SpO2 99%   BMI 44.71 kg/m²      Physical Exam  Vitals reviewed. "   Constitutional:       Appearance: Normal appearance. He is well-developed.   HENT:      Head:      Comments: No sinus tenderness     Right Ear: Tympanic membrane, ear canal and external ear normal.      Left Ear: Tympanic membrane, ear canal and external ear normal.      Nose: Nose normal.      Mouth/Throat:      Mouth: Mucous membranes are moist.      Pharynx: Oropharynx is clear.   Cardiovascular:      Rate and Rhythm: Normal rate and regular rhythm.      Heart sounds: Normal heart sounds.   Pulmonary:      Effort: Pulmonary effort is normal.      Breath sounds: Normal breath sounds.   Musculoskeletal:      Right lower leg: No edema.      Left lower leg: No edema.   Lymphadenopathy:      Cervical: No cervical adenopathy.   Skin:     General: Skin is warm.   Neurological:      General: No focal deficit present.      Mental Status: He is alert and oriented to person, place, and time.   Psychiatric:         Mood and Affect: Mood normal.         Behavior: Behavior normal.         Thought Content: Thought content normal.         Judgment: Judgment normal.

## 2025-03-03 ENCOUNTER — TELEPHONE (OUTPATIENT)
Dept: FAMILY MEDICINE CLINIC | Facility: CLINIC | Age: 40
End: 2025-03-03

## 2025-03-06 ENCOUNTER — OFFICE VISIT (OUTPATIENT)
Dept: FAMILY MEDICINE CLINIC | Facility: CLINIC | Age: 40
End: 2025-03-06
Payer: COMMERCIAL

## 2025-03-06 VITALS
BODY MASS INDEX: 44.87 KG/M2 | HEIGHT: 66 IN | HEART RATE: 87 BPM | TEMPERATURE: 97.8 F | SYSTOLIC BLOOD PRESSURE: 122 MMHG | OXYGEN SATURATION: 99 % | DIASTOLIC BLOOD PRESSURE: 82 MMHG | WEIGHT: 279.2 LBS

## 2025-03-06 DIAGNOSIS — M25.571 CHRONIC PAIN OF RIGHT ANKLE: ICD-10-CM

## 2025-03-06 DIAGNOSIS — M10.471 OTHER SECONDARY ACUTE GOUT OF RIGHT ANKLE: ICD-10-CM

## 2025-03-06 DIAGNOSIS — M25.471 RIGHT ANKLE EFFUSION: ICD-10-CM

## 2025-03-06 DIAGNOSIS — G89.29 CHRONIC PAIN OF RIGHT ANKLE: ICD-10-CM

## 2025-03-06 PROCEDURE — 99213 OFFICE O/P EST LOW 20 MIN: CPT | Performed by: FAMILY MEDICINE

## 2025-03-06 RX ORDER — COLCHICINE 0.6 MG/1
0.6 TABLET ORAL DAILY
Qty: 90 TABLET | Refills: 1 | Status: SHIPPED | OUTPATIENT
Start: 2025-03-06

## 2025-03-06 NOTE — PROGRESS NOTES
"Name: Edwar Kwong      : 1985      MRN: [id number]  Encounter Provider: Magda Scruggs MD  Encounter Date: 3/6/2025   Encounter department: [facility]  :  Assessment & Plan  Chronic pain of right ankle    Orders:    colchicine (COLCRYS) 0.6 mg tablet; Take 1 tablet (0.6 mg total) by mouth daily    Right ankle effusion    Orders:    colchicine (COLCRYS) 0.6 mg tablet; Take 1 tablet (0.6 mg total) by mouth daily    Other secondary acute gout of right ankle    Orders:    colchicine (COLCRYS) 0.6 mg tablet; Take 1 tablet (0.6 mg total) by mouth daily           History of Present Illness   Here for FMLA renewal. Taking the colchicine daily for prevention, if a flare occurs he will take an extra colchicine to toribio off the progression. Has steroid at home just in case.  Gout can affect either/or right or left. Has significantly reduced his alcohol intake. Tuna tends to be a bigger trigger. Uses tart cherry for prevention as well. BP controlled. Occ diarrhea with colchicine. Info for FMLA is same as previous form in 2024.       Review of Systems   Constitutional:  Negative for fatigue and fever.   HENT:  Negative for congestion.    Eyes:  Negative for visual disturbance.   Respiratory:  Negative for chest tightness and shortness of breath.    Cardiovascular:  Negative for chest pain and palpitations.   Gastrointestinal:  Positive for diarrhea. Negative for abdominal pain.   Genitourinary:  Negative for difficulty urinating.   Musculoskeletal:  Positive for arthralgias (foot pain during a flare).   Neurological:  Negative for headaches.   Hematological:  Does not bruise/bleed easily.       Objective   /82 (BP Location: Right arm, Patient Position: Sitting, Cuff Size: Large)   Pulse 87   Temp 97.8 °F (36.6 °C) (Temporal)   Ht 5' 5.5\" (1.664 m)   Wt 127 kg (279 lb 3.2 oz)   SpO2 99%   BMI 45.75 kg/m²      Physical Exam  Vitals reviewed.   Constitutional:       Appearance: Normal " appearance.   Cardiovascular:      Rate and Rhythm: Normal rate and regular rhythm.      Heart sounds: Normal heart sounds.   Pulmonary:      Effort: Pulmonary effort is normal.      Breath sounds: Normal breath sounds.   Musculoskeletal:      Right lower leg: No edema.      Left lower leg: No edema.   Neurological:      General: No focal deficit present.      Mental Status: He is alert and oriented to person, place, and time.   Psychiatric:         Mood and Affect: Mood normal.         Behavior: Behavior normal.         Thought Content: Thought content normal.         Judgment: Judgment normal.